# Patient Record
Sex: MALE | Race: WHITE | Employment: FULL TIME | URBAN - METROPOLITAN AREA
[De-identification: names, ages, dates, MRNs, and addresses within clinical notes are randomized per-mention and may not be internally consistent; named-entity substitution may affect disease eponyms.]

---

## 2017-02-13 RX ORDER — LISINOPRIL AND HYDROCHLOROTHIAZIDE 12.5; 1 MG/1; MG/1
1 TABLET ORAL DAILY
Qty: 90 TABLET | Refills: 3 | Status: SHIPPED | OUTPATIENT
Start: 2017-02-13 | End: 2018-01-11 | Stop reason: SDUPTHER

## 2017-02-13 RX ORDER — SIMVASTATIN 40 MG
40 TABLET ORAL NIGHTLY
Qty: 90 TABLET | Refills: 3 | Status: SHIPPED | OUTPATIENT
Start: 2017-02-13 | End: 2018-01-11 | Stop reason: SDUPTHER

## 2017-03-30 ENCOUNTER — OFFICE VISIT (OUTPATIENT)
Dept: PRIMARY CARE CLINIC | Age: 67
End: 2017-03-30

## 2017-03-30 VITALS
DIASTOLIC BLOOD PRESSURE: 70 MMHG | TEMPERATURE: 98.1 F | WEIGHT: 174 LBS | HEART RATE: 64 BPM | BODY MASS INDEX: 25.77 KG/M2 | HEIGHT: 69 IN | RESPIRATION RATE: 12 BRPM | SYSTOLIC BLOOD PRESSURE: 114 MMHG

## 2017-03-30 DIAGNOSIS — Z12.5 SCREENING FOR PROSTATE CANCER: ICD-10-CM

## 2017-03-30 DIAGNOSIS — Z00.00 HEALTH MAINTENANCE EXAMINATION: ICD-10-CM

## 2017-03-30 DIAGNOSIS — I10 ESSENTIAL HYPERTENSION: ICD-10-CM

## 2017-03-30 DIAGNOSIS — Z00.00 HEALTH MAINTENANCE EXAMINATION: Primary | ICD-10-CM

## 2017-03-30 LAB
ALBUMIN SERPL-MCNC: 4.8 G/DL (ref 3.9–4.9)
ALP BLD-CCNC: 68 U/L (ref 35–104)
ALT SERPL-CCNC: 19 U/L (ref 0–41)
ANION GAP SERPL CALCULATED.3IONS-SCNC: 12 MEQ/L (ref 7–13)
AST SERPL-CCNC: 22 U/L (ref 0–40)
BASOPHILS ABSOLUTE: 0 K/UL (ref 0–0.2)
BASOPHILS RELATIVE PERCENT: 0.5 %
BILIRUB SERPL-MCNC: 1.9 MG/DL (ref 0–1.2)
BUN BLDV-MCNC: 27 MG/DL (ref 8–23)
CALCIUM SERPL-MCNC: 10 MG/DL (ref 8.6–10.2)
CHLORIDE BLD-SCNC: 98 MEQ/L (ref 98–107)
CHOLESTEROL, TOTAL: 214 MG/DL (ref 0–199)
CO2: 24 MEQ/L (ref 22–29)
CREAT SERPL-MCNC: 1.08 MG/DL (ref 0.7–1.2)
EOSINOPHILS ABSOLUTE: 0 K/UL (ref 0–0.7)
EOSINOPHILS RELATIVE PERCENT: 0.6 %
GFR AFRICAN AMERICAN: >60
GFR NON-AFRICAN AMERICAN: >60
GLOBULIN: 2.7 G/DL (ref 2.3–3.5)
GLUCOSE BLD-MCNC: 94 MG/DL (ref 74–109)
HCT VFR BLD CALC: 43.9 % (ref 42–52)
HDLC SERPL-MCNC: 83 MG/DL (ref 40–59)
HEMOGLOBIN: 14.9 G/DL (ref 14–18)
LDL CHOLESTEROL CALCULATED: 115 MG/DL (ref 0–129)
LYMPHOCYTES ABSOLUTE: 1 K/UL (ref 1–4.8)
LYMPHOCYTES RELATIVE PERCENT: 18.4 %
MCH RBC QN AUTO: 31.8 PG (ref 27–31.3)
MCHC RBC AUTO-ENTMCNC: 33.9 % (ref 33–37)
MCV RBC AUTO: 93.9 FL (ref 80–100)
MONOCYTES ABSOLUTE: 0.7 K/UL (ref 0.2–0.8)
MONOCYTES RELATIVE PERCENT: 13.5 %
NEUTROPHILS ABSOLUTE: 3.6 K/UL (ref 1.4–6.5)
NEUTROPHILS RELATIVE PERCENT: 67 %
PDW BLD-RTO: 14.1 % (ref 11.5–14.5)
PLATELET # BLD: 213 K/UL (ref 130–400)
POTASSIUM SERPL-SCNC: 4.9 MEQ/L (ref 3.5–5.1)
PROSTATE SPECIFIC ANTIGEN: 1.14 NG/ML (ref 0–5.4)
RBC # BLD: 4.67 M/UL (ref 4.7–6.1)
SODIUM BLD-SCNC: 134 MEQ/L (ref 132–144)
TOTAL PROTEIN: 7.5 G/DL (ref 6.4–8.1)
TRIGL SERPL-MCNC: 80 MG/DL (ref 0–200)
WBC # BLD: 5.4 K/UL (ref 4.8–10.8)

## 2017-03-30 PROCEDURE — 99397 PER PM REEVAL EST PAT 65+ YR: CPT | Performed by: FAMILY MEDICINE

## 2017-03-30 ASSESSMENT — ENCOUNTER SYMPTOMS: ABDOMINAL PAIN: 0

## 2017-06-05 ENCOUNTER — OFFICE VISIT (OUTPATIENT)
Dept: PODIATRY | Age: 67
End: 2017-06-05

## 2017-06-05 VITALS
DIASTOLIC BLOOD PRESSURE: 78 MMHG | HEIGHT: 70 IN | SYSTOLIC BLOOD PRESSURE: 124 MMHG | BODY MASS INDEX: 24.91 KG/M2 | WEIGHT: 174 LBS | RESPIRATION RATE: 16 BRPM | OXYGEN SATURATION: 98 % | HEART RATE: 72 BPM

## 2017-06-05 DIAGNOSIS — M79.671 RIGHT FOOT PAIN: Primary | ICD-10-CM

## 2017-06-05 DIAGNOSIS — L84 CALLOSITY: ICD-10-CM

## 2017-06-05 PROCEDURE — 99203 OFFICE O/P NEW LOW 30 MIN: CPT | Performed by: PODIATRIST

## 2017-06-05 PROCEDURE — 11055 PARING/CUTG B9 HYPRKER LES 1: CPT | Performed by: PODIATRIST

## 2017-10-27 ENCOUNTER — NURSE ONLY (OUTPATIENT)
Dept: PRIMARY CARE CLINIC | Age: 67
End: 2017-10-27

## 2017-10-27 DIAGNOSIS — Z23 NEED FOR INFLUENZA VACCINATION: Primary | ICD-10-CM

## 2017-10-27 PROCEDURE — 90471 IMMUNIZATION ADMIN: CPT | Performed by: FAMILY MEDICINE

## 2017-10-27 PROCEDURE — 90662 IIV NO PRSV INCREASED AG IM: CPT | Performed by: FAMILY MEDICINE

## 2017-10-27 NOTE — PROGRESS NOTES
Vaccine Information Sheet, \"Influenza - Inactivated\"  given to Da Luong, or parent/legal guardian of  Da Luong and verbalized understanding. Patient responses:    Have you ever had a reaction to a flu vaccine? No  Are you able to eat eggs without adverse effects? Yes  Do you have any current illness? No  Have you ever had [place] Syndrome? No    Flu vaccine given per order. Please see immunization tab.

## 2018-01-11 RX ORDER — SIMVASTATIN 40 MG
40 TABLET ORAL NIGHTLY
Qty: 90 TABLET | Refills: 3 | Status: SHIPPED | OUTPATIENT
Start: 2018-01-11 | End: 2019-01-29 | Stop reason: SDUPTHER

## 2018-01-11 RX ORDER — LISINOPRIL AND HYDROCHLOROTHIAZIDE 12.5; 1 MG/1; MG/1
1 TABLET ORAL DAILY
Qty: 90 TABLET | Refills: 3 | Status: SHIPPED | OUTPATIENT
Start: 2018-01-11 | End: 2019-01-29 | Stop reason: SDUPTHER

## 2018-05-10 ENCOUNTER — OFFICE VISIT (OUTPATIENT)
Dept: PRIMARY CARE CLINIC | Age: 68
End: 2018-05-10
Payer: COMMERCIAL

## 2018-05-10 VITALS
DIASTOLIC BLOOD PRESSURE: 76 MMHG | BODY MASS INDEX: 26.81 KG/M2 | SYSTOLIC BLOOD PRESSURE: 134 MMHG | HEIGHT: 69 IN | WEIGHT: 181 LBS | TEMPERATURE: 98.1 F | HEART RATE: 75 BPM | OXYGEN SATURATION: 96 % | RESPIRATION RATE: 16 BRPM

## 2018-05-10 DIAGNOSIS — Z00.00 ANNUAL PHYSICAL EXAM: Primary | ICD-10-CM

## 2018-05-10 DIAGNOSIS — Z23 NEED FOR PNEUMOCOCCAL VACCINATION: ICD-10-CM

## 2018-05-10 DIAGNOSIS — Z12.5 SCREENING FOR PROSTATE CANCER: ICD-10-CM

## 2018-05-10 DIAGNOSIS — Z00.00 ANNUAL PHYSICAL EXAM: ICD-10-CM

## 2018-05-10 DIAGNOSIS — I10 ESSENTIAL HYPERTENSION: ICD-10-CM

## 2018-05-10 DIAGNOSIS — E78.5 HYPERLIPIDEMIA, UNSPECIFIED HYPERLIPIDEMIA TYPE: ICD-10-CM

## 2018-05-10 LAB
ALBUMIN SERPL-MCNC: 4.8 G/DL (ref 3.9–4.9)
ALP BLD-CCNC: 69 U/L (ref 35–104)
ALT SERPL-CCNC: 18 U/L (ref 0–41)
ANION GAP SERPL CALCULATED.3IONS-SCNC: 15 MEQ/L (ref 7–13)
AST SERPL-CCNC: 24 U/L (ref 0–40)
BILIRUB SERPL-MCNC: 1.2 MG/DL (ref 0–1.2)
BUN BLDV-MCNC: 15 MG/DL (ref 8–23)
CALCIUM SERPL-MCNC: 9.5 MG/DL (ref 8.6–10.2)
CHLORIDE BLD-SCNC: 100 MEQ/L (ref 98–107)
CHOLESTEROL, TOTAL: 169 MG/DL (ref 0–199)
CO2: 25 MEQ/L (ref 22–29)
CREAT SERPL-MCNC: 0.77 MG/DL (ref 0.7–1.2)
GFR AFRICAN AMERICAN: >60
GFR NON-AFRICAN AMERICAN: >60
GLOBULIN: 2.4 G/DL (ref 2.3–3.5)
GLUCOSE BLD-MCNC: 82 MG/DL (ref 74–109)
HCT VFR BLD CALC: 40.8 % (ref 42–52)
HDLC SERPL-MCNC: 75 MG/DL (ref 40–59)
HEMOGLOBIN: 13.9 G/DL (ref 14–18)
LDL CHOLESTEROL CALCULATED: 78 MG/DL (ref 0–129)
MCH RBC QN AUTO: 32.5 PG (ref 27–31.3)
MCHC RBC AUTO-ENTMCNC: 34.1 % (ref 33–37)
MCV RBC AUTO: 95.3 FL (ref 80–100)
PDW BLD-RTO: 14.9 % (ref 11.5–14.5)
PLATELET # BLD: 213 K/UL (ref 130–400)
POTASSIUM SERPL-SCNC: 5 MEQ/L (ref 3.5–5.1)
PROSTATE SPECIFIC ANTIGEN: 1.45 NG/ML (ref 0–5.4)
RBC # BLD: 4.28 M/UL (ref 4.7–6.1)
SODIUM BLD-SCNC: 140 MEQ/L (ref 132–144)
TOTAL PROTEIN: 7.2 G/DL (ref 6.4–8.1)
TRIGL SERPL-MCNC: 82 MG/DL (ref 0–200)
WBC # BLD: 5.2 K/UL (ref 4.8–10.8)

## 2018-05-10 PROCEDURE — 90670 PCV13 VACCINE IM: CPT | Performed by: FAMILY MEDICINE

## 2018-05-10 PROCEDURE — 99397 PER PM REEVAL EST PAT 65+ YR: CPT | Performed by: FAMILY MEDICINE

## 2018-05-10 PROCEDURE — 90471 IMMUNIZATION ADMIN: CPT | Performed by: FAMILY MEDICINE

## 2018-05-10 ASSESSMENT — PATIENT HEALTH QUESTIONNAIRE - PHQ9
1. LITTLE INTEREST OR PLEASURE IN DOING THINGS: 0
SUM OF ALL RESPONSES TO PHQ QUESTIONS 1-9: 0
SUM OF ALL RESPONSES TO PHQ9 QUESTIONS 1 & 2: 0
2. FEELING DOWN, DEPRESSED OR HOPELESS: 0

## 2018-05-10 ASSESSMENT — ENCOUNTER SYMPTOMS
EYE DISCHARGE: 0
CHOKING: 0
CHEST TIGHTNESS: 0
PHOTOPHOBIA: 0
NAUSEA: 0
WHEEZING: 0
STRIDOR: 0
FACIAL SWELLING: 0
APNEA: 0
CONSTIPATION: 0
COLOR CHANGE: 0
DIARRHEA: 0
EYE REDNESS: 0
EYE PAIN: 0
ABDOMINAL PAIN: 0

## 2018-09-18 DIAGNOSIS — Z23 NEED FOR SHINGLES VACCINE: Primary | ICD-10-CM

## 2018-10-12 ENCOUNTER — NURSE ONLY (OUTPATIENT)
Dept: PRIMARY CARE CLINIC | Age: 68
End: 2018-10-12
Payer: COMMERCIAL

## 2018-10-12 DIAGNOSIS — Z23 NEED FOR IMMUNIZATION AGAINST INFLUENZA: Primary | ICD-10-CM

## 2018-10-12 PROCEDURE — 90471 IMMUNIZATION ADMIN: CPT | Performed by: FAMILY MEDICINE

## 2018-10-12 PROCEDURE — 90662 IIV NO PRSV INCREASED AG IM: CPT | Performed by: FAMILY MEDICINE

## 2019-01-29 RX ORDER — LISINOPRIL AND HYDROCHLOROTHIAZIDE 12.5; 1 MG/1; MG/1
1 TABLET ORAL DAILY
Qty: 90 TABLET | Refills: 3 | Status: SHIPPED | OUTPATIENT
Start: 2019-01-29

## 2019-01-29 RX ORDER — SIMVASTATIN 40 MG
40 TABLET ORAL NIGHTLY
Qty: 90 TABLET | Refills: 3 | Status: SHIPPED | OUTPATIENT
Start: 2019-01-29

## 2019-03-28 DIAGNOSIS — M54.41 CHRONIC LOW BACK PAIN WITH RIGHT-SIDED SCIATICA, UNSPECIFIED BACK PAIN LATERALITY: Primary | ICD-10-CM

## 2019-03-28 DIAGNOSIS — G89.29 CHRONIC LOW BACK PAIN WITH RIGHT-SIDED SCIATICA, UNSPECIFIED BACK PAIN LATERALITY: Primary | ICD-10-CM

## 2019-03-28 DIAGNOSIS — M54.31 RIGHT SIDED SCIATICA: ICD-10-CM

## 2019-03-28 RX ORDER — PREDNISONE 10 MG/1
TABLET ORAL
Qty: 40 TABLET | Refills: 1 | Status: SHIPPED | OUTPATIENT
Start: 2019-03-28

## 2023-06-01 ENCOUNTER — OFFICE VISIT (OUTPATIENT)
Dept: PRIMARY CARE | Facility: CLINIC | Age: 73
End: 2023-06-01
Payer: COMMERCIAL

## 2023-06-01 VITALS
RESPIRATION RATE: 14 BRPM | HEART RATE: 109 BPM | DIASTOLIC BLOOD PRESSURE: 70 MMHG | BODY MASS INDEX: 25.4 KG/M2 | HEIGHT: 69 IN | OXYGEN SATURATION: 96 % | SYSTOLIC BLOOD PRESSURE: 142 MMHG

## 2023-06-01 DIAGNOSIS — R07.89 CHEST PRESSURE: ICD-10-CM

## 2023-06-01 DIAGNOSIS — Z91.89 AT RISK FOR CORONARY ARTERY DISEASE: ICD-10-CM

## 2023-06-01 DIAGNOSIS — R60.9 EDEMA, UNSPECIFIED TYPE: ICD-10-CM

## 2023-06-01 DIAGNOSIS — T69.9XXA COLD EXPOSURE, INITIAL ENCOUNTER: Primary | ICD-10-CM

## 2023-06-01 DIAGNOSIS — I10 ESSENTIAL HYPERTENSION: ICD-10-CM

## 2023-06-01 PROCEDURE — 1036F TOBACCO NON-USER: CPT | Performed by: FAMILY MEDICINE

## 2023-06-01 PROCEDURE — 3078F DIAST BP <80 MM HG: CPT | Performed by: FAMILY MEDICINE

## 2023-06-01 PROCEDURE — 99213 OFFICE O/P EST LOW 20 MIN: CPT | Performed by: FAMILY MEDICINE

## 2023-06-01 PROCEDURE — 3077F SYST BP >= 140 MM HG: CPT | Performed by: FAMILY MEDICINE

## 2023-06-01 RX ORDER — TORSEMIDE 20 MG/1
20 TABLET ORAL DAILY
Qty: 30 TABLET | Refills: 11 | Status: SHIPPED | OUTPATIENT
Start: 2023-06-01 | End: 2023-06-26

## 2023-06-01 ASSESSMENT — ENCOUNTER SYMPTOMS
NEUROLOGICAL NEGATIVE: 1
RESPIRATORY NEGATIVE: 1
GASTROINTESTINAL NEGATIVE: 1
ENDOCRINE NEGATIVE: 1
PSYCHIATRIC NEGATIVE: 1
HEMATOLOGIC/LYMPHATIC NEGATIVE: 1
CARDIOVASCULAR NEGATIVE: 1
LOSS OF SENSATION IN FEET: 0
MUSCULOSKELETAL NEGATIVE: 1
DEPRESSION: 0
CONSTITUTIONAL NEGATIVE: 1
ALLERGIC/IMMUNOLOGIC NEGATIVE: 1
OCCASIONAL FEELINGS OF UNSTEADINESS: 0
EYES NEGATIVE: 1

## 2023-06-01 NOTE — PROGRESS NOTES
"Subjective   Patient ID: Tonio Sebastian is a 72 y.o. male who presents for chest discomfort and bilateral foot swelling and tingling.  This started after he was in his pool trying to drain part of the water.  The water temperature was very cold and he was doing some work on the pool lines.  He denies nausea vomiting diaphoresis shortness of breath etc.    HPI Pt states his B'L feet have been tingling since this morning, he does have some swelling.    Review of Systems   Constitutional: Negative.    HENT: Negative.     Eyes: Negative.    Respiratory: Negative.     Cardiovascular: Negative.    Gastrointestinal: Negative.    Endocrine: Negative.    Genitourinary: Negative.    Musculoskeletal: Negative.    Skin: Negative.    Allergic/Immunologic: Negative.    Neurological: Negative.    Hematological: Negative.    Psychiatric/Behavioral: Negative.         Objective   /70 (BP Location: Left arm, Patient Position: Sitting, BP Cuff Size: Adult)   Pulse 109   Resp 14   Ht 1.753 m (5' 9\")   SpO2 96%   BMI 25.40 kg/m²     Physical Exam CONSTITUTIONAL- NAD, Pt is A & O x3, Vital signs reviewed per chart.  General Appearance- normal , good hygiene,talks easily  EYES-PERRLA conjunctiva and lids- normal  EARS/NOSE-TM's normal, head normocephalic and atraumatic, naso pharynx-no nasal discharge, no trismus,  oropharynx- normal without exudate  NECK- supple, FROM, without mass  thyroid- NT, normal size, no nodule noted  LYMPH- WNL  CV- RRR without murmur, gallop, or other abnormality.  PULM- CTA bilaterally  Respiratory effort- normal respiratory effort , no retractions or nasal flaring  ABDOMEN- normoactive BS's , soft , NT, no hepatosplenomegaly palpated, or masses. No pulsatile masses noted  extremities no edema,NT  SKIN- no abnormal skin lesions on inspection or palpation  neuro- no focal deficits  PSYCH- pleasant, normal judgement and insight   Strong distal pulses noted in the lower extremities bilaterally.  Normal " capillary refill bilaterally.  Minimal trace edema.  Otherwise completely normal neurovascular exam.  Assessment/Plan   Problem List Items Addressed This Visit       Essential hypertension    Cold exposure - Primary     Other Visit Diagnoses       Chest pressure        Relevant Medications    torsemide (Demadex) 20 mg tablet    Other Relevant Orders    XR chest 2 views (Completed)    At risk for coronary artery disease        Relevant Orders    CT cardiac scoring wo IV contrast    Edema, unspecified type                 Scribe Attestation  By signing my name below, I, Miguel Stover DO  , Scribe   attest that this documentation has been prepared under the direction and in the presence of Miguel Stover DO.

## 2023-06-06 PROBLEM — E78.5 HYPERLIPIDEMIA: Status: ACTIVE | Noted: 2023-06-06

## 2023-06-06 PROBLEM — T69.9XXA COLD EXPOSURE: Status: ACTIVE | Noted: 2023-06-06

## 2023-06-06 PROBLEM — R60.0 LOCALIZED EDEMA: Status: ACTIVE | Noted: 2023-06-06

## 2023-06-06 PROBLEM — M72.0 DUPUYTREN CONTRACTURE: Status: ACTIVE | Noted: 2023-06-06

## 2023-06-06 PROBLEM — I10 ESSENTIAL HYPERTENSION: Status: ACTIVE | Noted: 2023-06-06

## 2023-06-23 DIAGNOSIS — R07.89 CHEST PRESSURE: ICD-10-CM

## 2023-06-26 RX ORDER — TORSEMIDE 20 MG/1
TABLET ORAL
Qty: 30 TABLET | Refills: 2 | Status: SHIPPED | OUTPATIENT
Start: 2023-06-26 | End: 2024-06-10 | Stop reason: WASHOUT

## 2023-09-26 ENCOUNTER — OFFICE VISIT (OUTPATIENT)
Dept: PRIMARY CARE | Facility: CLINIC | Age: 73
End: 2023-09-26
Payer: COMMERCIAL

## 2023-09-26 VITALS
WEIGHT: 170 LBS | RESPIRATION RATE: 16 BRPM | OXYGEN SATURATION: 98 % | SYSTOLIC BLOOD PRESSURE: 120 MMHG | DIASTOLIC BLOOD PRESSURE: 56 MMHG | HEART RATE: 76 BPM | HEIGHT: 69 IN | BODY MASS INDEX: 25.18 KG/M2

## 2023-09-26 DIAGNOSIS — R53.83 FATIGUE, UNSPECIFIED TYPE: ICD-10-CM

## 2023-09-26 DIAGNOSIS — Z12.5 SCREENING FOR PROSTATE CANCER: ICD-10-CM

## 2023-09-26 DIAGNOSIS — Z00.00 ANNUAL PHYSICAL EXAM: Primary | ICD-10-CM

## 2023-09-26 PROBLEM — R93.1 AGATSTON CAC SCORE 100-199: Status: ACTIVE | Noted: 2023-09-26

## 2023-09-26 PROCEDURE — 99397 PER PM REEVAL EST PAT 65+ YR: CPT | Performed by: FAMILY MEDICINE

## 2023-09-26 RX ORDER — LISINOPRIL AND HYDROCHLOROTHIAZIDE 10; 12.5 MG/1; MG/1
1 TABLET ORAL DAILY
COMMUNITY
Start: 2015-08-18

## 2023-09-26 RX ORDER — CHOLECALCIFEROL (VITAMIN D3) 25 MCG
1 TABLET ORAL DAILY
COMMUNITY

## 2023-09-26 RX ORDER — SIMVASTATIN 40 MG/1
1 TABLET, FILM COATED ORAL DAILY
COMMUNITY
Start: 2015-08-18

## 2023-09-26 NOTE — PATIENT INSTRUCTIONS
Follow up PRN    Routine labs ordered    Liquid nitrogen applied to lesion on left side of forehead

## 2023-09-26 NOTE — PROGRESS NOTES
Subjective   Patient ID: Tonio Sebastian is a 72 y.o. male who presents for annual exam     HPI Annual physical   Eats a generally healthy diet   Exercises   Denies any chest pain,SOB  No Abdominal pain   No black or bloody stools   Urination/BM normal     Pt has a lesion on his forehead that he noticed a few weeks ago.   Liquid nitrogen applied    Review of Systems 12 Systems have been reviewed as follows.  Constitutional: Fever, weight gain, weight loss, appetite change, night sweats, fatigue, chills.  Eyes : blurry, double vision, vision, loss, tearing, redness, pain, sensitivity to light, glaucoma.  Ears: nose, mouth, and throat: Hearing loss, ringing in the ears, ear pain, nasal congestion, nasal drainage, nosebleeds, mouth, throat, irritation tooth problem.  Cardiovascular: chest pain, pressure, heart racing, palpitations, sweating, leg swelling, high or low blood pressure  Pulmonary: Cough, yellow or green sputum, blood and sputum, shortness of breath, wheezing  Gastrointestinal: Nausea, vomiting, diarrhea, constipation, pain, blood in stool, or vomitus, heartburn, difficulty swallowing  Genitourinary: incontinence, abnormal bleeding, abnormal discharge, urinary frequency, urinary hesitancy, pain, impotence sexual problem, infection, urinary retention  Musculoskeletal: Pain, stiffness, joint, redness or warmth, arthritis, back pain, weakness, muscle wasting, sprain or fracture  Neuro: Weight weakness, dizziness, change in voice, change in taste change in vision, change in hearing, loss, or change of sensation, trouble walking, balance problems coordination problems, shaking, speech problem  Endocrine: cold or heat intolerance, blood sugar problem, weight gain or loss missed periods hot flashes, sweats, change in body hair, change in libido, increased thirst, increased urination  Heme/lymph: Swelling, bleeding, problem anemia, bruising, enlarged lymph nodes  Allergic/immunologic: H. plus nasal drip, watery  "itchy eyes, nasal drainage, immunosuppressed  The above were reviewed and noted negative except as noted in HPI and Problem List.      Objective   /56   Pulse 76   Resp 16   Ht 1.753 m (5' 9\")   Wt 77.1 kg (170 lb)   SpO2 98%   BMI 25.10 kg/m²     Physical Exam CONSTITUTIONAL- NAD, Pt is A & O x3, Vital signs reviewed per chart.  General Appearance- normal , good hygiene,talks easily  EYES-PERRLA conjunctiva and lids- normal  EARS/NOSE-TM's normal, head normocephalic and atraumatic, naso pharynx-no nasal discharge, no trismus,  oropharynx- normal without exudate  NECK- supple, FROM, without mass  thyroid- NT, normal size, no nodule noted  LYMPH- WNL  CV- RRR without murmur, gallop, or other abnormality.  PULM- CTA bilaterally  Respiratory effort- normal respiratory effort , no retractions or nasal flaring  ABDOMEN- normoactive BS's , soft , NT, no hepatosplenomegaly palpated, or masses. No pulsatile masses noted  extremities no edema,NT  SKIN- no abnormal skin lesions on inspection or palpation  neuro- no focal deficits  PSYCH- pleasant, normal judgement and insight  LN2 to 4x6mm ju K R forehead.    Assessment/Plan   Problem List Items Addressed This Visit    None  Visit Diagnoses         Codes    Annual physical exam    -  Primary Z00.00    Relevant Orders    CBC and Auto Differential    Lipid Panel    Comprehensive Metabolic Panel    Thyroxine, Free    Thyroxine, Total    Thyroid Stimulating Hormone    Fatigue, unspecified type     R53.83    Relevant Orders    CBC and Auto Differential    Lipid Panel    Comprehensive Metabolic Panel    Thyroxine, Free    Thyroxine, Total    Thyroid Stimulating Hormone    Screening for prostate cancer     Z12.5    Relevant Orders    Prostate Specific Antigen, Screen             Scribe Attestation  By signing my name below, I, Miguel Stover DO, Scribe   attest that this documentation has been prepared under the direction and in the presence of Miguel Stover DO.    "

## 2023-09-29 ENCOUNTER — LAB (OUTPATIENT)
Dept: LAB | Facility: LAB | Age: 73
End: 2023-09-29
Payer: COMMERCIAL

## 2023-09-29 ENCOUNTER — CLINICAL SUPPORT (OUTPATIENT)
Dept: PRIMARY CARE | Facility: CLINIC | Age: 73
End: 2023-09-29
Payer: COMMERCIAL

## 2023-09-29 DIAGNOSIS — Z12.5 SCREENING FOR PROSTATE CANCER: ICD-10-CM

## 2023-09-29 DIAGNOSIS — Z00.00 ANNUAL PHYSICAL EXAM: ICD-10-CM

## 2023-09-29 DIAGNOSIS — R53.83 FATIGUE, UNSPECIFIED TYPE: ICD-10-CM

## 2023-09-29 DIAGNOSIS — Z23 ENCOUNTER FOR IMMUNIZATION: ICD-10-CM

## 2023-09-29 DIAGNOSIS — Z23 NEED FOR INFLUENZA VACCINATION: ICD-10-CM

## 2023-09-29 LAB
ALANINE AMINOTRANSFERASE (SGPT) (U/L) IN SER/PLAS: 16 U/L (ref 10–52)
ALBUMIN (G/DL) IN SER/PLAS: 4.4 G/DL (ref 3.4–5)
ALKALINE PHOSPHATASE (U/L) IN SER/PLAS: 63 U/L (ref 33–136)
ANION GAP IN SER/PLAS: 12 MMOL/L (ref 10–20)
ASPARTATE AMINOTRANSFERASE (SGOT) (U/L) IN SER/PLAS: 19 U/L (ref 9–39)
BASOPHILS (10*3/UL) IN BLOOD BY AUTOMATED COUNT: 0.02 X10E9/L (ref 0–0.1)
BASOPHILS/100 LEUKOCYTES IN BLOOD BY AUTOMATED COUNT: 0.4 % (ref 0–2)
BILIRUBIN TOTAL (MG/DL) IN SER/PLAS: 0.9 MG/DL (ref 0–1.2)
CALCIUM (MG/DL) IN SER/PLAS: 9.5 MG/DL (ref 8.6–10.3)
CARBON DIOXIDE, TOTAL (MMOL/L) IN SER/PLAS: 27 MMOL/L (ref 21–32)
CHLORIDE (MMOL/L) IN SER/PLAS: 105 MMOL/L (ref 98–107)
CHOLESTEROL (MG/DL) IN SER/PLAS: 170 MG/DL (ref 0–199)
CHOLESTEROL IN HDL (MG/DL) IN SER/PLAS: 77.3 MG/DL
CHOLESTEROL/HDL RATIO: 2.2
CREATININE (MG/DL) IN SER/PLAS: 1.05 MG/DL (ref 0.5–1.3)
EOSINOPHILS (10*3/UL) IN BLOOD BY AUTOMATED COUNT: 0.06 X10E9/L (ref 0–0.4)
EOSINOPHILS/100 LEUKOCYTES IN BLOOD BY AUTOMATED COUNT: 1.3 % (ref 0–6)
ERYTHROCYTE DISTRIBUTION WIDTH (RATIO) BY AUTOMATED COUNT: 12.9 % (ref 11.5–14.5)
ERYTHROCYTE MEAN CORPUSCULAR HEMOGLOBIN CONCENTRATION (G/DL) BY AUTOMATED: 32.6 G/DL (ref 32–36)
ERYTHROCYTE MEAN CORPUSCULAR VOLUME (FL) BY AUTOMATED COUNT: 101 FL (ref 80–100)
ERYTHROCYTES (10*6/UL) IN BLOOD BY AUTOMATED COUNT: 3.8 X10E12/L (ref 4.5–5.9)
GFR MALE: 75 ML/MIN/1.73M2
GLUCOSE (MG/DL) IN SER/PLAS: 81 MG/DL (ref 74–99)
HEMATOCRIT (%) IN BLOOD BY AUTOMATED COUNT: 38.3 % (ref 41–52)
HEMOGLOBIN (G/DL) IN BLOOD: 12.5 G/DL (ref 13.5–17.5)
IMMATURE GRANULOCYTES/100 LEUKOCYTES IN BLOOD BY AUTOMATED COUNT: 0.2 % (ref 0–0.9)
LDL: 79 MG/DL (ref 0–99)
LEUKOCYTES (10*3/UL) IN BLOOD BY AUTOMATED COUNT: 4.6 X10E9/L (ref 4.4–11.3)
LYMPHOCYTES (10*3/UL) IN BLOOD BY AUTOMATED COUNT: 0.7 X10E9/L (ref 0.8–3)
LYMPHOCYTES/100 LEUKOCYTES IN BLOOD BY AUTOMATED COUNT: 15.1 % (ref 13–44)
MONOCYTES (10*3/UL) IN BLOOD BY AUTOMATED COUNT: 0.37 X10E9/L (ref 0.05–0.8)
MONOCYTES/100 LEUKOCYTES IN BLOOD BY AUTOMATED COUNT: 8 % (ref 2–10)
NEUTROPHILS (10*3/UL) IN BLOOD BY AUTOMATED COUNT: 3.48 X10E9/L (ref 1.6–5.5)
NEUTROPHILS/100 LEUKOCYTES IN BLOOD BY AUTOMATED COUNT: 75 % (ref 40–80)
PLATELETS (10*3/UL) IN BLOOD AUTOMATED COUNT: 227 X10E9/L (ref 150–450)
POTASSIUM (MMOL/L) IN SER/PLAS: 4.7 MMOL/L (ref 3.5–5.3)
PROSTATE SPECIFIC ANTIGEN,SCREEN: 1.29 NG/ML (ref 0–4)
PROTEIN TOTAL: 7.3 G/DL (ref 6.4–8.2)
SODIUM (MMOL/L) IN SER/PLAS: 139 MMOL/L (ref 136–145)
THYROTROPIN (MIU/L) IN SER/PLAS BY DETECTION LIMIT <= 0.05 MIU/L: 2.04 MIU/L (ref 0.44–3.98)
THYROXINE (T4) (UG/DL) IN SER/PLAS: 4.5 UG/DL (ref 4.5–11.1)
THYROXINE (T4) FREE (NG/DL) IN SER/PLAS: 0.79 NG/DL (ref 0.61–1.12)
TRIGLYCERIDE (MG/DL) IN SER/PLAS: 68 MG/DL (ref 0–149)
UREA NITROGEN (MG/DL) IN SER/PLAS: 28 MG/DL (ref 6–23)
VLDL: 14 MG/DL (ref 0–40)

## 2023-09-29 PROCEDURE — 80061 LIPID PANEL: CPT

## 2023-09-29 PROCEDURE — 36415 COLL VENOUS BLD VENIPUNCTURE: CPT

## 2023-09-29 PROCEDURE — 85025 COMPLETE CBC W/AUTO DIFF WBC: CPT

## 2023-09-29 PROCEDURE — 90662 IIV NO PRSV INCREASED AG IM: CPT | Performed by: FAMILY MEDICINE

## 2023-09-29 PROCEDURE — 84153 ASSAY OF PSA TOTAL: CPT

## 2023-09-29 PROCEDURE — 80053 COMPREHEN METABOLIC PANEL: CPT

## 2023-09-29 PROCEDURE — 90471 IMMUNIZATION ADMIN: CPT | Performed by: FAMILY MEDICINE

## 2023-09-29 PROCEDURE — 84443 ASSAY THYROID STIM HORMONE: CPT

## 2023-09-29 PROCEDURE — 84439 ASSAY OF FREE THYROXINE: CPT

## 2023-09-29 NOTE — PROGRESS NOTES
Patient presents for influenza vaccine.  Patient information on flu given to patient  Temp 97.8, denies any reaction to previous doses, denies allergies to eggs and latex

## 2023-11-15 ENCOUNTER — SPECIALTY PHARMACY (OUTPATIENT)
Dept: PHARMACY | Facility: CLINIC | Age: 73
End: 2023-11-15

## 2023-11-15 DIAGNOSIS — I10 ESSENTIAL HYPERTENSION: ICD-10-CM

## 2023-11-15 DIAGNOSIS — E78.5 HYPERLIPIDEMIA, UNSPECIFIED HYPERLIPIDEMIA TYPE: ICD-10-CM

## 2023-11-15 RX ORDER — LISINOPRIL AND HYDROCHLOROTHIAZIDE 10; 12.5 MG/1; MG/1
1 TABLET ORAL DAILY
Qty: 90 TABLET | Refills: 3 | Status: SHIPPED | OUTPATIENT
Start: 2023-11-15 | End: 2024-11-13

## 2023-11-15 RX ORDER — SIMVASTATIN 40 MG/1
40 TABLET, FILM COATED ORAL DAILY
Qty: 90 TABLET | Refills: 3 | Status: SHIPPED | OUTPATIENT
Start: 2023-11-15 | End: 2024-11-13

## 2023-11-16 ENCOUNTER — PHARMACY VISIT (OUTPATIENT)
Dept: PHARMACY | Facility: CLINIC | Age: 73
End: 2023-11-16
Payer: COMMERCIAL

## 2023-11-16 PROCEDURE — RXMED WILLOW AMBULATORY MEDICATION CHARGE

## 2023-11-20 ENCOUNTER — SPECIALTY PHARMACY (OUTPATIENT)
Dept: PHARMACY | Facility: CLINIC | Age: 73
End: 2023-11-20

## 2023-12-05 ENCOUNTER — OFFICE VISIT (OUTPATIENT)
Dept: OPHTHALMOLOGY | Facility: CLINIC | Age: 73
End: 2023-12-05
Payer: COMMERCIAL

## 2023-12-05 DIAGNOSIS — H52.4 PRESBYOPIA: ICD-10-CM

## 2023-12-05 DIAGNOSIS — H25.13 NUCLEAR SCLEROTIC CATARACT OF BOTH EYES: ICD-10-CM

## 2023-12-05 DIAGNOSIS — H52.13 MYOPIA OF BOTH EYES: Primary | ICD-10-CM

## 2023-12-05 PROCEDURE — 92004 COMPRE OPH EXAM NEW PT 1/>: CPT | Performed by: OPTOMETRIST

## 2023-12-05 PROCEDURE — 92015 DETERMINE REFRACTIVE STATE: CPT | Performed by: OPTOMETRIST

## 2023-12-05 RX ORDER — ASPIRIN 81 MG/1
81 TABLET ORAL DAILY
COMMUNITY

## 2023-12-05 ASSESSMENT — TONOMETRY
OD_IOP_MMHG: 16
IOP_METHOD: GOLDMANN APPLANATION
OS_IOP_MMHG: 16

## 2023-12-05 ASSESSMENT — REFRACTION_MANIFEST
OS_AXIS: 090
METHOD_AUTOREFRACTION: 1
OD_SPHERE: -0.75
OD_SPHERE: -0.50
OS_ADD: +2.50
OS_SPHERE: -0.25
OS_SPHERE: -0.25
OS_AXIS: 093
OS_CYLINDER: -0.75
OD_ADD: +2.50
OS_CYLINDER: -0.75
OD_AXIS: 001
OD_CYLINDER: -0.25

## 2023-12-05 ASSESSMENT — CONF VISUAL FIELD
OS_NORMAL: 1
OD_SUPERIOR_NASAL_RESTRICTION: 0
OS_SUPERIOR_NASAL_RESTRICTION: 0
METHOD: COUNTING FINGERS
OD_NORMAL: 1
OS_INFERIOR_TEMPORAL_RESTRICTION: 0
OD_INFERIOR_TEMPORAL_RESTRICTION: 0
OS_INFERIOR_NASAL_RESTRICTION: 0
OD_INFERIOR_NASAL_RESTRICTION: 0
OD_SUPERIOR_TEMPORAL_RESTRICTION: 0
OS_SUPERIOR_TEMPORAL_RESTRICTION: 0

## 2023-12-05 ASSESSMENT — ENCOUNTER SYMPTOMS
GASTROINTESTINAL NEGATIVE: 0
ENDOCRINE NEGATIVE: 0
HEMATOLOGIC/LYMPHATIC NEGATIVE: 0
RESPIRATORY NEGATIVE: 0
ALLERGIC/IMMUNOLOGIC NEGATIVE: 0
MUSCULOSKELETAL NEGATIVE: 0
CARDIOVASCULAR NEGATIVE: 0
PSYCHIATRIC NEGATIVE: 0
NEUROLOGICAL NEGATIVE: 0
CONSTITUTIONAL NEGATIVE: 0
EYES NEGATIVE: 0

## 2023-12-05 ASSESSMENT — CUP TO DISC RATIO
OD_RATIO: 0.2
OS_RATIO: 0.2

## 2023-12-05 ASSESSMENT — SLIT LAMP EXAM - LIDS
COMMENTS: NORMAL
COMMENTS: NORMAL

## 2023-12-05 ASSESSMENT — VISUAL ACUITY
OS_SC: 20/40
OS_SC+: -3
OD_SC: 20/30
METHOD: SNELLEN - LINEAR
OD_SC+: -3

## 2023-12-05 ASSESSMENT — REFRACTION
OD_SPHERE: -0.50
OD_ADD: +2.50
OS_CYLINDER: -0.75
OS_ADD: +2.50
OS_SPHERE: -0.50
OS_AXIS: 090

## 2023-12-05 ASSESSMENT — EXTERNAL EXAM - RIGHT EYE: OD_EXAM: NORMAL

## 2023-12-05 ASSESSMENT — EXTERNAL EXAM - LEFT EYE: OS_EXAM: NORMAL

## 2023-12-05 NOTE — PROGRESS NOTES
Assessment/Plan   Diagnoses and all orders for this visit:  Myopia of both eyes  Presbyopia  New spec rx released today per patient request. Ocular health wnl for age OU. Monitor 1 year or sooner prn. Refraction billed today.    Nuclear sclerotic cataract of both eyes  Patient's cataracts are not visually significant. Will monitor for changes. No indication for surgery at this time.

## 2023-12-12 ENCOUNTER — SPECIALTY PHARMACY (OUTPATIENT)
Dept: PHARMACY | Facility: CLINIC | Age: 73
End: 2023-12-12

## 2024-02-20 ENCOUNTER — PHARMACY VISIT (OUTPATIENT)
Dept: PHARMACY | Facility: CLINIC | Age: 74
End: 2024-02-20
Payer: COMMERCIAL

## 2024-02-20 ENCOUNTER — SPECIALTY PHARMACY (OUTPATIENT)
Dept: PHARMACY | Facility: CLINIC | Age: 74
End: 2024-02-20

## 2024-02-20 PROCEDURE — RXMED WILLOW AMBULATORY MEDICATION CHARGE

## 2024-05-16 ENCOUNTER — TELEPHONE (OUTPATIENT)
Dept: PRIMARY CARE | Facility: CLINIC | Age: 74
End: 2024-05-16
Payer: COMMERCIAL

## 2024-05-16 DIAGNOSIS — D64.9 ANEMIA, UNSPECIFIED TYPE: Primary | ICD-10-CM

## 2024-05-20 ENCOUNTER — SPECIALTY PHARMACY (OUTPATIENT)
Dept: PHARMACY | Facility: CLINIC | Age: 74
End: 2024-05-20

## 2024-05-20 PROCEDURE — RXMED WILLOW AMBULATORY MEDICATION CHARGE

## 2024-05-22 ENCOUNTER — PHARMACY VISIT (OUTPATIENT)
Dept: PHARMACY | Facility: CLINIC | Age: 74
End: 2024-05-22
Payer: COMMERCIAL

## 2024-06-05 ENCOUNTER — LAB (OUTPATIENT)
Dept: LAB | Facility: LAB | Age: 74
End: 2024-06-05
Payer: COMMERCIAL

## 2024-06-05 DIAGNOSIS — D64.9 ANEMIA, UNSPECIFIED TYPE: ICD-10-CM

## 2024-06-05 LAB
ERYTHROCYTE [DISTWIDTH] IN BLOOD BY AUTOMATED COUNT: 13.2 % (ref 11.5–14.5)
HCT VFR BLD AUTO: 38.2 % (ref 41–52)
HGB BLD-MCNC: 12.8 G/DL (ref 13.5–17.5)
MCH RBC QN AUTO: 32.8 PG (ref 26–34)
MCHC RBC AUTO-ENTMCNC: 33.5 G/DL (ref 32–36)
MCV RBC AUTO: 98 FL (ref 80–100)
NRBC BLD-RTO: 0 /100 WBCS (ref 0–0)
PLATELET # BLD AUTO: 211 X10*3/UL (ref 150–450)
RBC # BLD AUTO: 3.9 X10*6/UL (ref 4.5–5.9)
WBC # BLD AUTO: 6.3 X10*3/UL (ref 4.4–11.3)

## 2024-06-05 PROCEDURE — 36415 COLL VENOUS BLD VENIPUNCTURE: CPT

## 2024-06-05 PROCEDURE — 85027 COMPLETE CBC AUTOMATED: CPT

## 2024-06-10 DIAGNOSIS — Z12.11 SCREEN FOR COLON CANCER: ICD-10-CM

## 2024-06-10 DIAGNOSIS — Z00.00 ROUTINE GENERAL MEDICAL EXAMINATION AT A HEALTH CARE FACILITY: Primary | ICD-10-CM

## 2024-07-24 ENCOUNTER — APPOINTMENT (OUTPATIENT)
Dept: DERMATOLOGY | Facility: CLINIC | Age: 74
End: 2024-07-24
Payer: COMMERCIAL

## 2024-07-24 DIAGNOSIS — D18.01 HEMANGIOMA OF SKIN: ICD-10-CM

## 2024-07-24 DIAGNOSIS — D22.9 MELANOCYTIC NEVUS, UNSPECIFIED LOCATION: Primary | ICD-10-CM

## 2024-07-24 DIAGNOSIS — Z12.83 ENCOUNTER FOR SCREENING FOR MALIGNANT NEOPLASM OF SKIN: ICD-10-CM

## 2024-07-24 DIAGNOSIS — L57.0 ACTINIC KERATOSIS: ICD-10-CM

## 2024-07-24 DIAGNOSIS — L81.4 LENTIGO: ICD-10-CM

## 2024-07-24 DIAGNOSIS — L82.1 SEBORRHEIC KERATOSIS: ICD-10-CM

## 2024-07-24 PROCEDURE — 17003 DESTRUCT PREMALG LES 2-14: CPT | Performed by: DERMATOLOGY

## 2024-07-24 PROCEDURE — 1159F MED LIST DOCD IN RCRD: CPT | Performed by: DERMATOLOGY

## 2024-07-24 PROCEDURE — 17000 DESTRUCT PREMALG LESION: CPT | Performed by: DERMATOLOGY

## 2024-07-24 PROCEDURE — 99203 OFFICE O/P NEW LOW 30 MIN: CPT | Performed by: DERMATOLOGY

## 2024-07-24 PROCEDURE — 1036F TOBACCO NON-USER: CPT | Performed by: DERMATOLOGY

## 2024-07-24 ASSESSMENT — DERMATOLOGY QUALITY OF LIFE (QOL) ASSESSMENT
RATE HOW BOTHERED YOU ARE BY EFFECTS OF YOUR SKIN PROBLEMS ON YOUR ACTIVITIES (EG, GOING OUT, ACCOMPLISHING WHAT YOU WANT, WORK ACTIVITIES OR YOUR RELATIONSHIPS WITH OTHERS): 0 - NEVER BOTHERED
ARE THERE EXCLUSIONS OR EXCEPTIONS FOR THE QUALITY OF LIFE ASSESSMENT: NO
DATE THE QUALITY-OF-LIFE ASSESSMENT WAS COMPLETED: 67045
WHAT SINGLE SKIN CONDITION LISTED BELOW IS THE PATIENT ANSWERING THE QUALITY-OF-LIFE ASSESSMENT QUESTIONS ABOUT: NONE OF THE ABOVE
RATE HOW BOTHERED YOU ARE BY SYMPTOMS OF YOUR SKIN PROBLEM (EG, ITCHING, STINGING BURNING, HURTING OR SKIN IRRITATION): 0 - NEVER BOTHERED
RATE HOW EMOTIONALLY BOTHERED YOU ARE BY YOUR SKIN PROBLEM (FOR EXAMPLE, WORRY, EMBARRASSMENT, FRUSTRATION): 0 - NEVER BOTHERED

## 2024-07-24 ASSESSMENT — DERMATOLOGY PATIENT ASSESSMENT
DO YOU USE A TANNING BED: NO
HAVE YOU HAD OR DO YOU HAVE VASCULAR DISEASE: NO
DO YOU HAVE ANY NEW OR CHANGING LESIONS: NO
HAVE YOU HAD OR DO YOU HAVE A STAPH INFECTION: NO
ARE YOU AN ORGAN TRANSPLANT RECIPIENT: NO
DO YOU USE SUNSCREEN: OCCASIONALLY

## 2024-07-24 ASSESSMENT — PATIENT GLOBAL ASSESSMENT (PGA): PATIENT GLOBAL ASSESSMENT: PATIENT GLOBAL ASSESSMENT:  1 - CLEAR

## 2024-07-24 NOTE — PROGRESS NOTES
Subjective     Tonio Sebastian is a 73 y.o. male who presents for the following: Skin Check.     Skin Cancer Screening  He has a history of moderate sun exposure. He is in the sun occasionally. He uses sunscreen occasionally. He reports no skin symptoms. His moles are not changing.    Spots that concern him: None          Review of Systems:  No other skin or systemic complaints other than what is documented elsewhere in the note.    The following portions of the chart were reviewed this encounter and updated as appropriate:       Skin Cancer History  No skin cancer on file.    Specialty Problems    None    Past Medical History:  Tonio Sebastian  has a past medical history of Personal history of other diseases of the circulatory system (09/23/2021).    Past Surgical History:  Tonio Sebastian  has a past surgical history that includes Other surgical history (08/26/2020) and Other surgical history (08/26/2020).    Family History:  Patient family history is not on file.       Objective   Well appearing patient in no apparent distress; mood and affect are within normal limits.    A full examination was performed including scalp, head, eyes, ears, nose, lips, neck, chest, axillae, abdomen, back, buttocks, bilateral upper extremities, bilateral lower extremities, hands, feet, fingers, toes, fingernails, and toenails. All findings within normal limits unless otherwise noted below.    Assessment/Plan   1. Actinic keratosis (2)  Scalp (2)  Destruction of Actinic Keratosis Procedure Note  Diagnosis: AK  Location: see physical exam  Informed consent: Discussed risks (permanent scarring, light or dark discoloration, infection, pain, bleeding, bruising, redness, blister formation, and recurrence of the lesion) and benefits of the procedure, as well as the alternatives.  Informed consent was obtained.  Anesthesia: not required  Procedure Details:  The lesion was destroyed with liquid nitrogen. The patient tolerated procedure  well.  Total number of lesions treated:  2  Plan:  The patient was instructed on post-op care. Recommend OTC analgesia as needed for pain.      Destr of lesion - Scalp (2)  Complexity: simple    Destruction method: cryotherapy    Informed consent: discussed and consent obtained    Lesion destroyed using liquid nitrogen: Yes    Cryotherapy cycles:  1  Outcome: patient tolerated procedure well with no complications    Post-procedure details: wound care instructions given      2. Encounter for screening for malignant neoplasm of skin    3. Hemangioma of skin  Scattered cherry-red papule(s).    4. Melanocytic nevus, unspecified location  All nevi were symmetric brown macules without atypia on dermoscopy.     The ABCDEs of melanoma and warning signs of non-melanoma skin cancer were discussed with patient and patient expressed understanding. Sun protection and use of at least SPF 30 discussed with patient. Pt instructed to reapply every 2 hours.     5. Lentigo  Scattered tan macules in sun-exposed areas.    The ABCDEs of melanoma and warning signs of non-melanoma skin cancer were discussed with patient and patient expressed understanding. Sun protection and use of at least SPF 30 discussed with patient. Pt instructed to reapply every 2 hours.     6. Seborrheic keratosis  Stuck on verrucous, tan-brown papules and plaques.         Follow up in 1 year or sooner as needed.

## 2024-08-20 ENCOUNTER — SPECIALTY PHARMACY (OUTPATIENT)
Dept: PHARMACY | Facility: CLINIC | Age: 74
End: 2024-08-20

## 2024-08-20 PROCEDURE — RXMED WILLOW AMBULATORY MEDICATION CHARGE

## 2024-08-22 ENCOUNTER — PHARMACY VISIT (OUTPATIENT)
Dept: PHARMACY | Facility: CLINIC | Age: 74
End: 2024-08-22
Payer: COMMERCIAL

## 2024-08-30 ENCOUNTER — ANESTHESIA EVENT (OUTPATIENT)
Dept: GASTROENTEROLOGY | Facility: EXTERNAL LOCATION | Age: 74
End: 2024-08-30

## 2024-09-10 ENCOUNTER — APPOINTMENT (OUTPATIENT)
Dept: GASTROENTEROLOGY | Facility: EXTERNAL LOCATION | Age: 74
End: 2024-09-10
Payer: COMMERCIAL

## 2024-09-10 ENCOUNTER — ANESTHESIA (OUTPATIENT)
Dept: GASTROENTEROLOGY | Facility: EXTERNAL LOCATION | Age: 74
End: 2024-09-10

## 2024-09-10 VITALS
RESPIRATION RATE: 15 BRPM | WEIGHT: 170 LBS | DIASTOLIC BLOOD PRESSURE: 57 MMHG | BODY MASS INDEX: 25.18 KG/M2 | TEMPERATURE: 96.8 F | HEART RATE: 73 BPM | SYSTOLIC BLOOD PRESSURE: 118 MMHG | OXYGEN SATURATION: 98 % | HEIGHT: 69 IN

## 2024-09-10 DIAGNOSIS — Z12.11 SCREEN FOR COLON CANCER: ICD-10-CM

## 2024-09-10 DIAGNOSIS — Z00.00 ROUTINE GENERAL MEDICAL EXAMINATION AT A HEALTH CARE FACILITY: ICD-10-CM

## 2024-09-10 PROCEDURE — 45385 COLONOSCOPY W/LESION REMOVAL: CPT | Performed by: STUDENT IN AN ORGANIZED HEALTH CARE EDUCATION/TRAINING PROGRAM

## 2024-09-10 RX ORDER — NORETHINDRONE AND ETHINYL ESTRADIOL 0.5-0.035
KIT ORAL AS NEEDED
Status: DISCONTINUED | OUTPATIENT
Start: 2024-09-10 | End: 2024-09-10

## 2024-09-10 RX ORDER — PROPOFOL 10 MG/ML
INJECTION, EMULSION INTRAVENOUS AS NEEDED
Status: DISCONTINUED | OUTPATIENT
Start: 2024-09-10 | End: 2024-09-10

## 2024-09-10 RX ORDER — LIDOCAINE HYDROCHLORIDE 20 MG/ML
INJECTION, SOLUTION INFILTRATION; PERINEURAL AS NEEDED
Status: DISCONTINUED | OUTPATIENT
Start: 2024-09-10 | End: 2024-09-10

## 2024-09-10 RX ORDER — SODIUM CHLORIDE 9 MG/ML
20 INJECTION, SOLUTION INTRAVENOUS CONTINUOUS
Status: DISCONTINUED | OUTPATIENT
Start: 2024-09-10 | End: 2024-09-11 | Stop reason: HOSPADM

## 2024-09-10 RX ORDER — PHENYLEPHRINE HYDROCHLORIDE 10 MG/ML
INJECTION INTRAVENOUS AS NEEDED
Status: DISCONTINUED | OUTPATIENT
Start: 2024-09-10 | End: 2024-09-10

## 2024-09-10 SDOH — HEALTH STABILITY: MENTAL HEALTH: CURRENT SMOKER: 0

## 2024-09-10 ASSESSMENT — PAIN - FUNCTIONAL ASSESSMENT
PAIN_FUNCTIONAL_ASSESSMENT: 0-10

## 2024-09-10 ASSESSMENT — PAIN SCALES - GENERAL
PAIN_LEVEL: 1
PAINLEVEL_OUTOF10: 0 - NO PAIN

## 2024-09-10 ASSESSMENT — COLUMBIA-SUICIDE SEVERITY RATING SCALE - C-SSRS
1. IN THE PAST MONTH, HAVE YOU WISHED YOU WERE DEAD OR WISHED YOU COULD GO TO SLEEP AND NOT WAKE UP?: NO
6. HAVE YOU EVER DONE ANYTHING, STARTED TO DO ANYTHING, OR PREPARED TO DO ANYTHING TO END YOUR LIFE?: NO

## 2024-09-10 NOTE — DISCHARGE INSTRUCTIONS

## 2024-09-10 NOTE — ADDENDUM NOTE
Addendum  created 09/10/24 1456 by CA Rodriguez    Intraprocedure Meds edited, Orders acknowledged in Narrator

## 2024-09-10 NOTE — ANESTHESIA POSTPROCEDURE EVALUATION
Patient: Tonio Sebastian    Procedure Summary       Date: 09/10/24 Room / Location: [facility]    Anesthesia Start: 1247 Anesthesia Stop: 1320    Procedure: COLONOSCOPY Diagnosis: Screen for colon cancer    Scheduled Providers: Eliel Gomez DO Responsible Provider: CA Rodriguez    Anesthesia Type: MAC ASA Status: 3            Anesthesia Type: MAC    Vitals Value Taken Time   BP 90/54 09/10/24 1310   Temp 36 °C (96.8 °F) 09/10/24 1310   Pulse 65 09/10/24 1310   Resp 23 09/10/24 1310   SpO2 97 % 09/10/24 1310       Anesthesia Post Evaluation    Patient location during evaluation: bedside  Patient participation: complete - patient participated  Level of consciousness: awake  Pain score: 1  Pain management: adequate  Airway patency: patent  Cardiovascular status: acceptable and hypotensive  Respiratory status: acceptable  Hydration status: hypovolemic  Postoperative Nausea and Vomiting: none        No notable events documented.

## 2024-09-10 NOTE — H&P
Outpatient NR Procedure H&P  Patient Profile  Name Tonio Sebastian  Date of Birth 1950  MRN [id number]  PCP Miguel Stover        Diagnosis: Colon cancer screening.  Procedure(s):  Colonoscopy.    Allergies  No Known Allergies    Past Medical History   Past Medical History:   Diagnosis Date    Personal history of other diseases of the circulatory system 09/23/2021    History of hypertension       Medication Reviewed - yes  Prior to Admission medications    Medication Sig Start Date End Date Taking? Authorizing Provider   aspirin 81 mg EC tablet Take 1 tablet (81 mg) by mouth once daily.    Historical Provider, MD   cholecalciferol (Vitamin D-3) 25 MCG (1000 UT) tablet Take 1 tablet (25 mcg) by mouth once daily.    Historical Provider, MD   lisinopriL-hydrochlorothiazide 10-12.5 mg tablet Take 1 tablet by mouth once daily. 8/18/15   Historical Provider, MD   lisinopriL-hydrochlorothiazide 10-12.5 mg tablet TAKE ONE (1) TABLET BY MOUTH DAILY 11/15/23 11/20/24  Miguel Stover DO   simvastatin (Zocor) 40 mg tablet Take 1 tablet (40 mg) by mouth once daily. 8/18/15   Historical Provider, MD   simvastatin (Zocor) 40 mg tablet TAKE ONE (1) TABLET BY MOUTH DAILY 11/15/23 11/20/24  Miguel Stover DO       Physical Exam  Vitals:    09/10/24 1230   BP: 145/83   Pulse: 82   Resp: 24   Temp: 36.7 °C (98.1 °F)   SpO2: 98%      Weight   Vitals:    09/10/24 1230   Weight: 77.1 kg (170 lb)     BMI Body mass index is 25.1 kg/m².    General: A&Ox3, NAD.  HEENT: AT/NC.   CV: RRR. No murmur.  Resp: CTA bilaterally. No wheezing, rhonchi or rales.   GI: Soft, NT/ND.  Extrem: No edema. Pulses intact.  Skin: No Jaundice.   Neuro: No focal deficits.   Psych: Normal mood and affect.        Oropharyngeal Classification II (hard and soft palate, upper portion of tonsils and uvula visible)  ASA PS Classification 3  Sedation Plan: Deep Sedation.  Procedure Plan - pre-procedural (re)assesment completed by physician:  discharge/transfer  patient when discharge criteria met    Eliel Gomez DO  9/10/2024 12:33 PM

## 2024-09-10 NOTE — ANESTHESIA PREPROCEDURE EVALUATION
Patient: Tonio Sebastian    Procedure Information       Date/Time: 09/10/24 1300    Scheduled providers: Eliel Gomez DO    Procedure: COLONOSCOPY    Location: [facility]            Relevant Problems   Anesthesia (within normal limits)      Cardiac   (+) Essential hypertension   (+) Hyperlipidemia      Pulmonary (within normal limits)      Neuro (within normal limits)      GI (within normal limits)      /Renal (within normal limits)      Liver (within normal limits)      Endocrine (within normal limits)      Hematology (within normal limits)      Musculoskeletal   (+) Localized primary osteoarthritis of wrist      HEENT (within normal limits)      ID (within normal limits)      Skin (within normal limits)      GYN (within normal limits)       Clinical information reviewed:    Allergies                NPO Detail:  NPO/Void Status  Date of Last Liquid: 09/10/24  Time of Last Liquid: 0800  Date of Last Solid: 09/08/24         Physical Exam    Airway  Mallampati: II  TM distance: >3 FB  Neck ROM: full     Cardiovascular   Rhythm: regular  Rate: normal     Dental    Pulmonary   Breath sounds clear to auscultation     Abdominal   Abdomen: soft  Bowel sounds: normal           Anesthesia Plan    History of general anesthesia?: yes  History of complications of general anesthesia?: no    ASA 3     MAC     The patient is not a current smoker.  Patient was not previously instructed to abstain from smoking on day of procedure.  Education provided regarding risk of obstructive sleep apnea.  intravenous induction   Anesthetic plan and risks discussed with patient.    Plan discussed with CRNA.

## 2024-09-13 ENCOUNTER — APPOINTMENT (OUTPATIENT)
Dept: PRIMARY CARE | Facility: CLINIC | Age: 74
End: 2024-09-13
Payer: COMMERCIAL

## 2024-09-13 VITALS
HEART RATE: 60 BPM | BODY MASS INDEX: 25.92 KG/M2 | DIASTOLIC BLOOD PRESSURE: 60 MMHG | TEMPERATURE: 97 F | OXYGEN SATURATION: 98 % | HEIGHT: 69 IN | SYSTOLIC BLOOD PRESSURE: 118 MMHG | WEIGHT: 175 LBS | RESPIRATION RATE: 16 BRPM

## 2024-09-13 DIAGNOSIS — E78.5 HYPERLIPIDEMIA, UNSPECIFIED HYPERLIPIDEMIA TYPE: ICD-10-CM

## 2024-09-13 DIAGNOSIS — E53.8 VITAMIN B12 DEFICIENCY: ICD-10-CM

## 2024-09-13 DIAGNOSIS — E78.2 MIXED HYPERLIPIDEMIA: ICD-10-CM

## 2024-09-13 DIAGNOSIS — Z12.5 SCREENING PSA (PROSTATE SPECIFIC ANTIGEN): ICD-10-CM

## 2024-09-13 DIAGNOSIS — Z00.00 ROUTINE GENERAL MEDICAL EXAMINATION AT A HEALTH CARE FACILITY: Primary | ICD-10-CM

## 2024-09-13 DIAGNOSIS — I10 ESSENTIAL HYPERTENSION: ICD-10-CM

## 2024-09-13 DIAGNOSIS — D64.9 ANEMIA, UNSPECIFIED TYPE: ICD-10-CM

## 2024-09-13 PROCEDURE — 3008F BODY MASS INDEX DOCD: CPT | Performed by: FAMILY MEDICINE

## 2024-09-13 PROCEDURE — 1159F MED LIST DOCD IN RCRD: CPT | Performed by: FAMILY MEDICINE

## 2024-09-13 PROCEDURE — 1157F ADVNC CARE PLAN IN RCRD: CPT | Performed by: FAMILY MEDICINE

## 2024-09-13 PROCEDURE — 99397 PER PM REEVAL EST PAT 65+ YR: CPT | Performed by: FAMILY MEDICINE

## 2024-09-13 PROCEDURE — 3078F DIAST BP <80 MM HG: CPT | Performed by: FAMILY MEDICINE

## 2024-09-13 PROCEDURE — 1160F RVW MEDS BY RX/DR IN RCRD: CPT | Performed by: FAMILY MEDICINE

## 2024-09-13 PROCEDURE — 1036F TOBACCO NON-USER: CPT | Performed by: FAMILY MEDICINE

## 2024-09-13 PROCEDURE — 3074F SYST BP LT 130 MM HG: CPT | Performed by: FAMILY MEDICINE

## 2024-09-13 RX ORDER — FERROUS SULFATE 325(65) MG
325 TABLET ORAL
COMMUNITY

## 2024-09-13 RX ORDER — SIMVASTATIN 40 MG/1
40 TABLET, FILM COATED ORAL DAILY
Qty: 90 TABLET | Refills: 1 | Status: SHIPPED | OUTPATIENT
Start: 2024-09-13 | End: 2025-09-12

## 2024-09-13 RX ORDER — LISINOPRIL AND HYDROCHLOROTHIAZIDE 10; 12.5 MG/1; MG/1
1 TABLET ORAL DAILY
Qty: 90 TABLET | Refills: 1 | Status: SHIPPED | OUTPATIENT
Start: 2024-09-13 | End: 2025-09-12

## 2024-09-13 ASSESSMENT — PATIENT HEALTH QUESTIONNAIRE - PHQ9
1. LITTLE INTEREST OR PLEASURE IN DOING THINGS: NOT AT ALL
SUM OF ALL RESPONSES TO PHQ9 QUESTIONS 1 AND 2: 0
2. FEELING DOWN, DEPRESSED OR HOPELESS: NOT AT ALL

## 2024-09-13 NOTE — PROGRESS NOTES
Subjective   Patient ID: Tonio Sebastian is a 73 y.o. male who presents for Establish Care, Annual Exam, Hypertension, and Hyperlipidemia.    HPI    Patient presents today to establish care. Last PCP was Dr Stover. Last saw PCP 1 years ago. [place] about us through wife.     Also presents today for annual exam, HTN, HLD follow up. Does eat a low sodium, low cholesterol diet. Does exercise. He does check his BP at home. Last eye exam was 2 yrs ago . Last dental exam was 2 month ago. Is/not fasting for BW. Denies any SOB or chest pain. Denies any acid reflux.     Refuse Flu vaccine today on 9/13/24    Advanced Care Planning  Tonio Sebastian and I discussed their advance care plan preferences such as living will, and durable health care power of , which include: yes Attempt Resuscitation, no Intubate & Ventilate, no Comfort Care or Life- Sustaning Care. I reminded patient to talk with their health care agent, Alicia Sebastian, about their health care goals. Note reflects patient's free will and care goals as expressed to me.     Colonoscopy due 2025    No there concern /question     Review of systems  ; Patient seen today for exam denies any problems with headaches or vision, denies any shortness of breath chest pain nausea or vomiting, no black stool no blood in the stool no heartburn type symptoms denies any problems with constipation or diarrhea, and no dysuria-type symptoms    The patient's allergies medications were reviewed with them today    The patient's social family and surgical history or also reviewed here today, along with her past medical history.     Objective     Alert and active in  no acute distress  HEENT TMs clear oropharynx negative nares clear no drainage noted neck supple  With no adenopathy   Heart regular rate and rhythm without murmur and no carotid bruits  Lungs- clear to auscultation bilaterally, no wheeze or rhonchi noted  Thyroid -negative masses or nodularity  Abdomen- soft times four  "quadrants , bowel sounds positive no masses or organomegaly, negative tenderness guarding or rebound  Neurological exam unremarkable- DTRs in upper and lower extremities within normal limits.   skin -no lesions noted      /60 (BP Location: Left arm, Patient Position: Sitting, BP Cuff Size: Adult)   Pulse 60   Temp 36.1 °C (97 °F) (Temporal)   Resp 16   Ht 1.753 m (5' 9\")   Wt 79.4 kg (175 lb)   SpO2 98%   BMI 25.84 kg/m²     No Known Allergies    Assessment/Plan   Problem List Items Addressed This Visit       Essential hypertension    Relevant Medications    lisinopriL-hydrochlorothiazide 10-12.5 mg tablet    Other Relevant Orders    Comprehensive Metabolic Panel    Hyperlipidemia    Relevant Medications    simvastatin (Zocor) 40 mg tablet    Other Relevant Orders    Lipid Panel    Screening PSA (prostate specific antigen)    Relevant Orders    Prostate Specific Antigen, Screen    BMI 25.0-25.9,adult     Other Visit Diagnoses       Routine general medical examination at a health care facility    -  Primary    Anemia, unspecified type        Relevant Orders    CBC and Auto Differential    Iron and TIBC    Ferritin    Vitamin B12 deficiency        Relevant Orders    Vitamin B12          Refill Lisinopril-hydrochlorothiazide, Simvastatin.     Recommend he get the COVID vaccine in the fall.     Recommend taking Aspirin with food.     Labs have been ordered, she/he will have these performed and we will contact her/him with results.  (CBC, CMP, Lipid, PSA, Iron & TIBC, Ferritin, Vitamin B12)    If anything worsens or changes please call us at once, follow up in the office as planned.    Scribe Attestation  By signing my name below, I, Marialuisa Levine MA, Scribe   attest that this documentation has been prepared under the direction and in the presence of Carlos Villasenor DO.      "

## 2024-09-14 ENCOUNTER — LAB (OUTPATIENT)
Dept: LAB | Facility: LAB | Age: 74
End: 2024-09-14
Payer: COMMERCIAL

## 2024-09-14 DIAGNOSIS — I10 ESSENTIAL HYPERTENSION: ICD-10-CM

## 2024-09-14 DIAGNOSIS — E53.8 VITAMIN B12 DEFICIENCY: ICD-10-CM

## 2024-09-14 DIAGNOSIS — D64.9 ANEMIA, UNSPECIFIED TYPE: ICD-10-CM

## 2024-09-14 DIAGNOSIS — Z12.5 SCREENING PSA (PROSTATE SPECIFIC ANTIGEN): ICD-10-CM

## 2024-09-14 DIAGNOSIS — E78.2 MIXED HYPERLIPIDEMIA: ICD-10-CM

## 2024-09-14 LAB
ALBUMIN SERPL BCP-MCNC: 4.5 G/DL (ref 3.4–5)
ALP SERPL-CCNC: 57 U/L (ref 33–136)
ALT SERPL W P-5'-P-CCNC: 16 U/L (ref 10–52)
ANION GAP SERPL CALC-SCNC: 13 MMOL/L (ref 10–20)
AST SERPL W P-5'-P-CCNC: 18 U/L (ref 9–39)
BASOPHILS # BLD AUTO: 0.02 X10*3/UL (ref 0–0.1)
BASOPHILS NFR BLD AUTO: 0.5 %
BILIRUB SERPL-MCNC: 1.1 MG/DL (ref 0–1.2)
BUN SERPL-MCNC: 21 MG/DL (ref 6–23)
CALCIUM SERPL-MCNC: 9.5 MG/DL (ref 8.6–10.3)
CHLORIDE SERPL-SCNC: 102 MMOL/L (ref 98–107)
CHOLEST SERPL-MCNC: 179 MG/DL (ref 0–199)
CHOLESTEROL/HDL RATIO: 2.6
CO2 SERPL-SCNC: 26 MMOL/L (ref 21–32)
CREAT SERPL-MCNC: 1.04 MG/DL (ref 0.5–1.3)
EGFRCR SERPLBLD CKD-EPI 2021: 76 ML/MIN/1.73M*2
EOSINOPHIL # BLD AUTO: 0.05 X10*3/UL (ref 0–0.4)
EOSINOPHIL NFR BLD AUTO: 1.3 %
ERYTHROCYTE [DISTWIDTH] IN BLOOD BY AUTOMATED COUNT: 12.7 % (ref 11.5–14.5)
FERRITIN SERPL-MCNC: 689 NG/ML (ref 20–300)
GLUCOSE SERPL-MCNC: 74 MG/DL (ref 74–99)
HCT VFR BLD AUTO: 40.4 % (ref 41–52)
HDLC SERPL-MCNC: 68.1 MG/DL
HGB BLD-MCNC: 13.3 G/DL (ref 13.5–17.5)
IMM GRANULOCYTES # BLD AUTO: 0.01 X10*3/UL (ref 0–0.5)
IMM GRANULOCYTES NFR BLD AUTO: 0.3 % (ref 0–0.9)
IRON SATN MFR SERPL: 67 % (ref 25–45)
IRON SERPL-MCNC: 190 UG/DL (ref 35–150)
LDLC SERPL CALC-MCNC: 88 MG/DL
LYMPHOCYTES # BLD AUTO: 1.07 X10*3/UL (ref 0.8–3)
LYMPHOCYTES NFR BLD AUTO: 27.1 %
MCH RBC QN AUTO: 32.9 PG (ref 26–34)
MCHC RBC AUTO-ENTMCNC: 32.9 G/DL (ref 32–36)
MCV RBC AUTO: 100 FL (ref 80–100)
MONOCYTES # BLD AUTO: 0.47 X10*3/UL (ref 0.05–0.8)
MONOCYTES NFR BLD AUTO: 11.9 %
NEUTROPHILS # BLD AUTO: 2.33 X10*3/UL (ref 1.6–5.5)
NEUTROPHILS NFR BLD AUTO: 58.9 %
NON HDL CHOLESTEROL: 111 MG/DL (ref 0–149)
NRBC BLD-RTO: 0 /100 WBCS (ref 0–0)
PLATELET # BLD AUTO: 220 X10*3/UL (ref 150–450)
POTASSIUM SERPL-SCNC: 5.3 MMOL/L (ref 3.5–5.3)
PROT SERPL-MCNC: 7 G/DL (ref 6.4–8.2)
PSA SERPL-MCNC: 1.53 NG/ML
RBC # BLD AUTO: 4.04 X10*6/UL (ref 4.5–5.9)
SODIUM SERPL-SCNC: 136 MMOL/L (ref 136–145)
TIBC SERPL-MCNC: 284 UG/DL (ref 240–445)
TRIGL SERPL-MCNC: 117 MG/DL (ref 0–149)
UIBC SERPL-MCNC: 94 UG/DL (ref 110–370)
VIT B12 SERPL-MCNC: 357 PG/ML (ref 211–911)
VLDL: 23 MG/DL (ref 0–40)
WBC # BLD AUTO: 4 X10*3/UL (ref 4.4–11.3)

## 2024-09-14 PROCEDURE — 84153 ASSAY OF PSA TOTAL: CPT

## 2024-09-14 PROCEDURE — 82607 VITAMIN B-12: CPT

## 2024-09-14 PROCEDURE — 83550 IRON BINDING TEST: CPT

## 2024-09-14 PROCEDURE — 36415 COLL VENOUS BLD VENIPUNCTURE: CPT

## 2024-09-14 PROCEDURE — 82728 ASSAY OF FERRITIN: CPT

## 2024-09-14 PROCEDURE — 85025 COMPLETE CBC W/AUTO DIFF WBC: CPT

## 2024-09-14 PROCEDURE — 80061 LIPID PANEL: CPT

## 2024-09-14 PROCEDURE — 83540 ASSAY OF IRON: CPT

## 2024-09-14 PROCEDURE — 80053 COMPREHEN METABOLIC PANEL: CPT

## 2024-09-18 LAB
LABORATORY COMMENT REPORT: NORMAL
PATH REPORT.FINAL DX SPEC: NORMAL
PATH REPORT.GROSS SPEC: NORMAL
PATH REPORT.TOTAL CANCER: NORMAL
RESIDENT REVIEW: NORMAL

## 2024-11-19 ENCOUNTER — SPECIALTY PHARMACY (OUTPATIENT)
Dept: PHARMACY | Facility: CLINIC | Age: 74
End: 2024-11-19

## 2024-11-22 DIAGNOSIS — E78.5 HYPERLIPIDEMIA, UNSPECIFIED HYPERLIPIDEMIA TYPE: ICD-10-CM

## 2024-11-22 DIAGNOSIS — I10 ESSENTIAL HYPERTENSION: ICD-10-CM

## 2024-11-22 RX ORDER — LISINOPRIL AND HYDROCHLOROTHIAZIDE 10; 12.5 MG/1; MG/1
1 TABLET ORAL DAILY
Qty: 90 TABLET | Refills: 1 | Status: SHIPPED | OUTPATIENT
Start: 2024-11-22 | End: 2025-11-21

## 2024-11-22 RX ORDER — SIMVASTATIN 40 MG/1
40 TABLET, FILM COATED ORAL DAILY
Qty: 90 TABLET | Refills: 1 | Status: SHIPPED | OUTPATIENT
Start: 2024-11-22 | End: 2025-11-21

## 2024-12-02 PROCEDURE — RXMED WILLOW AMBULATORY MEDICATION CHARGE

## 2024-12-05 ENCOUNTER — PHARMACY VISIT (OUTPATIENT)
Dept: PHARMACY | Facility: CLINIC | Age: 74
End: 2024-12-05
Payer: COMMERCIAL

## 2024-12-09 ENCOUNTER — APPOINTMENT (OUTPATIENT)
Dept: OPHTHALMOLOGY | Facility: CLINIC | Age: 74
End: 2024-12-09
Payer: COMMERCIAL

## 2024-12-09 DIAGNOSIS — H52.4 PRESBYOPIA: ICD-10-CM

## 2024-12-09 DIAGNOSIS — H52.13 MYOPIA OF BOTH EYES: Primary | ICD-10-CM

## 2024-12-09 DIAGNOSIS — H02.88B MEIBOMIAN GLAND DYSFUNCTION LEFT EYE, UPPER AND LOWER EYELIDS: ICD-10-CM

## 2024-12-09 DIAGNOSIS — H02.88A MEIBOMIAN GLAND DYSFUNCTION RIGHT EYE, UPPER AND LOWER EYELIDS: ICD-10-CM

## 2024-12-09 DIAGNOSIS — H25.13 NUCLEAR SCLEROTIC CATARACT OF BOTH EYES: ICD-10-CM

## 2024-12-09 PROBLEM — R53.83 FATIGUE: Status: ACTIVE | Noted: 2024-12-09

## 2024-12-09 PROCEDURE — 92014 COMPRE OPH EXAM EST PT 1/>: CPT | Performed by: OPTOMETRIST

## 2024-12-09 PROCEDURE — 92015 DETERMINE REFRACTIVE STATE: CPT | Performed by: OPTOMETRIST

## 2024-12-09 ASSESSMENT — CONF VISUAL FIELD
OD_INFERIOR_TEMPORAL_RESTRICTION: 0
OS_SUPERIOR_NASAL_RESTRICTION: 0
OD_SUPERIOR_NASAL_RESTRICTION: 0
OD_INFERIOR_NASAL_RESTRICTION: 0
OS_INFERIOR_TEMPORAL_RESTRICTION: 0
OS_SUPERIOR_TEMPORAL_RESTRICTION: 0
OD_SUPERIOR_TEMPORAL_RESTRICTION: 0
OS_NORMAL: 1
METHOD: COUNTING FINGERS
OS_INFERIOR_NASAL_RESTRICTION: 0
OD_NORMAL: 1

## 2024-12-09 ASSESSMENT — TONOMETRY
OD_IOP_MMHG: 16
IOP_METHOD: GOLDMANN APPLANATION
OS_IOP_MMHG: 18

## 2024-12-09 ASSESSMENT — CUP TO DISC RATIO
OD_RATIO: 0.2
OS_RATIO: 0.2

## 2024-12-09 ASSESSMENT — ENCOUNTER SYMPTOMS
CARDIOVASCULAR NEGATIVE: 0
EYES NEGATIVE: 1
GASTROINTESTINAL NEGATIVE: 0
RESPIRATORY NEGATIVE: 0
ALLERGIC/IMMUNOLOGIC NEGATIVE: 0
ENDOCRINE NEGATIVE: 0
HEMATOLOGIC/LYMPHATIC NEGATIVE: 0
MUSCULOSKELETAL NEGATIVE: 0
CONSTITUTIONAL NEGATIVE: 0
NEUROLOGICAL NEGATIVE: 0
PSYCHIATRIC NEGATIVE: 0

## 2024-12-09 ASSESSMENT — REFRACTION_MANIFEST
OS_SPHERE: -0.50
OS_CYLINDER: -0.50
OD_ADD: +2.75
OS_AXIS: 090
OD_SPHERE: -0.50
OS_ADD: +2.75

## 2024-12-09 ASSESSMENT — REFRACTION_WEARINGRX
OS_SPHERE: -0.50
OS_CYLINDER: -0.50
OS_AXIS: 090
OD_SPHERE: -0.50

## 2024-12-09 ASSESSMENT — EXTERNAL EXAM - LEFT EYE: OS_EXAM: NORMAL

## 2024-12-09 ASSESSMENT — REFRACTION
OS_AXIS: 090
OS_SPHERE: -0.50
OS_CYLINDER: -0.75
OD_SPHERE: -0.50

## 2024-12-09 ASSESSMENT — VISUAL ACUITY
OD_CC: J1+
OD_SC: 20/25
OD_BAT_MED: 20/50
OS_BAT_MED: 20/100
METHOD: SNELLEN - LINEAR
OS_CC: J1+
OS_SC: 20/30-2

## 2024-12-09 ASSESSMENT — EXTERNAL EXAM - RIGHT EYE: OD_EXAM: NORMAL

## 2024-12-09 ASSESSMENT — SLIT LAMP EXAM - LIDS
COMMENTS: 2+ MGD
COMMENTS: 2+ MGD

## 2024-12-09 NOTE — PROGRESS NOTES
Assessment/Plan   Diagnoses and all orders for this visit:  Myopia of both eyes  Presbyopia  New spec rx released today per patient request. Ocular health wnl for age OU. Monitor 1 year or sooner prn. Refraction billed today. Pt consents to receiving glasses Rx today. Patient's/guardian's signature obtained to acknowledge and confirm that a paper copy of glasses Rx was given to patient in compliance with [facility] Eyeglass Rule. Electronic copy of Rx will also be available via [email or web address].       Nuclear sclerotic cataract of both eyes  Patient's cataracts are visually significant at this time. Options for surgical referral discussed. Pt would like to wait on referral. Will monitor for changes.      Meibomian gland dysfunction right eye, upper and lower eyelids  Meibomian gland dysfunction left eye, upper and lower eyelids  Recommend hot compresses with lid massage bid OU. Recommend lid cleansing qhs OU. Discussed that this is a chronic problem which requires chronic/consistent treatment.     RTC in 1year for CEE + MRX or sooner prn.

## 2024-12-23 ENCOUNTER — TELEMEDICINE (OUTPATIENT)
Dept: PRIMARY CARE | Facility: CLINIC | Age: 74
End: 2024-12-23
Payer: COMMERCIAL

## 2024-12-23 DIAGNOSIS — J22 LOWER RESPIRATORY INFECTION (E.G., BRONCHITIS, PNEUMONIA, PNEUMONITIS, PULMONITIS): Primary | ICD-10-CM

## 2024-12-23 PROCEDURE — 1159F MED LIST DOCD IN RCRD: CPT

## 2024-12-23 PROCEDURE — 1157F ADVNC CARE PLAN IN RCRD: CPT

## 2024-12-23 PROCEDURE — 99213 OFFICE O/P EST LOW 20 MIN: CPT

## 2024-12-23 PROCEDURE — 1036F TOBACCO NON-USER: CPT

## 2024-12-23 PROCEDURE — 1160F RVW MEDS BY RX/DR IN RCRD: CPT

## 2024-12-23 RX ORDER — AZITHROMYCIN 250 MG/1
TABLET, FILM COATED ORAL
Qty: 6 TABLET | Refills: 0 | Status: SHIPPED | OUTPATIENT
Start: 2024-12-23 | End: 2024-12-28

## 2024-12-23 ASSESSMENT — COPD QUESTIONNAIRES: COPD: 0

## 2024-12-23 ASSESSMENT — ENCOUNTER SYMPTOMS
COUGH: 1
LIGHT-HEADEDNESS: 0
RHINORRHEA: 1
VOMITING: 0
FEVER: 0
DIZZINESS: 0
SHORTNESS OF BREATH: 0
SORE THROAT: 1
CHILLS: 0
NAUSEA: 0
MYALGIAS: 0
HEADACHES: 1

## 2024-12-23 NOTE — PROGRESS NOTES
Subjective   Patient ID: Tonio Sebastian is a 74 y.o. male who presents for cough.     With patient's permission, this is a Telemedicine visit with video and audio. Provider located at office address. Patient located at their home address. All issues as documented below were discussed and addressed but limited physical exam was performed. If it was felt that the patient should be evaluated via face-to-face office appointment(s) they were directed to appropriate location.     Cough  This is a new problem. The current episode started in the past 7 days. The problem has been unchanged. The cough is Productive of sputum. Associated symptoms include headaches, rhinorrhea and a sore throat. Pertinent negatives include no chest pain, chills, ear congestion, ear pain, fever, myalgias, nasal congestion, postnasal drip or shortness of breath. Nothing aggravates the symptoms. He has tried OTC cough suppressant for the symptoms. The treatment provided mild relief. There is no history of asthma or COPD.   Daughters are sick. No COVID testing.     Review of Systems   Constitutional:  Negative for chills and fever.   HENT:  Positive for rhinorrhea and sore throat. Negative for ear pain, postnasal drip and sneezing.    Respiratory:  Positive for cough. Negative for shortness of breath.    Cardiovascular:  Negative for chest pain.   Gastrointestinal:  Negative for nausea and vomiting.   Musculoskeletal:  Negative for myalgias.   Neurological:  Positive for headaches. Negative for dizziness and light-headedness.     Objective   There were no vitals taken for this visit.    Physical Exam  Limited due to virtual encounter.   General:  Appears alert and oriented and well-nourished with no signs of acute distress.   Face:  Normal appearing with no obvious neurological deficits.  Eyes:  EOMI x2.  Respiratory:  Breathing is non-labored.   Psychiatric:  Affect is appropriate and shows good judgment.     Assessment/Plan   Assessment &  Plan  Lower respiratory infection (e.g., bronchitis, pneumonia, pneumonitis, pulmonitis)  Acute. Suspect bronchitis.   Azithromycin as directed. Risks and benefits of medication discussed and prescribed.   OTC Tylenol/Ibuprofen as directed for headaches, sore throat. OTC antihistamine as directed for sinus drainage, PND. OTC Mucinex as directed for productive cough. Increase fluids, rest, humidifier.   Follow up with PCP/UC if symptoms do not improve within 7-10 days, or sooner for worsening.     Orders:    azithromycin (Zithromax) 250 mg tablet; Take 2 tablets (500 mg) by mouth once daily for 1 day, THEN 1 tablet (250 mg) once daily for 4 days. Take 2 tabs (500 mg) by mouth today, than 1 daily for 4 days..

## 2025-04-04 ENCOUNTER — SPECIALTY PHARMACY (OUTPATIENT)
Dept: PHARMACY | Facility: CLINIC | Age: 75
End: 2025-04-04

## 2025-04-04 PROCEDURE — RXMED WILLOW AMBULATORY MEDICATION CHARGE

## 2025-04-05 ENCOUNTER — PHARMACY VISIT (OUTPATIENT)
Dept: PHARMACY | Facility: CLINIC | Age: 75
End: 2025-04-05
Payer: COMMERCIAL

## 2025-04-11 NOTE — PROGRESS NOTES
Subjective   Patient ID: Tonio Sebastian is a 74 y.o. male who presents for Hyperlipidemia and Hypertension.  HPI    Patient presents today for a follow-up on Hypertension and Hyperlipidemia. Does try follow a low sugar and low fat diet. Does not check BP at home. Does exercise. Eye exam was up-to-date.    Last BW was on 9/14/24 showing high iron level but low blood count. He takes iron supplement. He is also taking zinc, Vitamin B12, and vitamin C supplements. Not taking Vitamin D.     Patient complaints of a wart on his lower left back. Patient would like to have that removed.     Patient overall doing well. No concerns or complaints.     Last colonoscopy was on 9/10/24, 2 subcentimeter polyps was removed. On 5-year plan.    Immunizations were reviewed. Age-appropriate vaccines are all up-to-date.    He is planning to go on a trip with family.      Review of systems  ; Patient seen today for exam denies any problems with headaches or vision, denies any shortness of breath chest pain nausea or vomiting, no black stool no blood in the stool no heartburn type symptoms denies any problems with constipation or diarrhea, and no dysuria-type symptoms    The patient's allergies medications were reviewed with them today    The patient's social family and surgical history or also reviewed here today, along with her past medical history.     Objective     Alert and active in  no acute distress  HEENT TMs clear oropharynx negative nares clear no drainage noted neck supple  With no adenopathy   Heart regular rate and rhythm without murmur and no carotid bruits  Lungs- clear to auscultation bilaterally, no wheeze or rhonchi noted  Thyroid -negative masses or nodularity  Abdomen- soft times four quadrants , bowel sounds positive no masses or organomegaly, negative tenderness guarding or rebound    skin -no lesions noted      /70 (BP Location: Right arm, Patient Position: Sitting, BP Cuff Size: Adult)   Temp 36.6 °C (97.8  "°F) (Temporal)   Ht 1.753 m (5' 9\")   Wt 83.6 kg (184 lb 6.4 oz)   BMI 27.23 kg/m²     No Known Allergies    Assessment/Plan   Problem List Items Addressed This Visit    None  Visit Diagnoses         Iron deficiency anemia due to chronic blood loss    -  Primary    Relevant Orders    CBC and Auto Differential (Completed)    Ferritin (Completed)    Folate (Completed)    Iron and TIBC (Completed)      BMI 27.0-27.9,adult          Malaise        Relevant Orders    TSH with reflex to Free T4 if abnormal (Completed)      Vitamin D deficiency        Relevant Orders    Vitamin D 25-Hydroxy,Total (for eval of Vitamin D levels) (Completed)      B12 deficiency        Relevant Orders    Vitamin B12 (Completed)      Seborrheic keratosis          Anxiety        Relevant Medications    ALPRAZolam (Xanax) 0.5 mg tablet            Patient is overall doing well with the current medication regimen and will continue with the medications.    We frozen the wart on his left lower back that is consistent with seborrheic keratosis.    Reviewed CBC, Iron level.   Recommended getting a second opinion on his iron levels given that he has a high iron level but a low blood count.    Labs have been ordered, she/he will have these performed and we will contact her/him with results.  (CBC, TSH, Vitamin D, Vitamin B12, Folate, Ferritin, Iron & TIBC)    Prescribed Xanax 0.5 mg today for flight anxiety. Take 30-40 mins prior to flight.    Recommended Arexvy for RSV vaccine.    If anything worsens or changes please call us at once, follow up in the office as planned,       Scribe Attestation  By signing my name below, IRoxanne Scribe   attest that this documentation has been prepared under the direction and in the presence of Carlos Villasenor DO.    All medical record entries made by the Scribe were at my direction and personally dictated by me.   I have reviewed the chart and agree that the record accurately reflects my personal " performance of the history, physical exam, discussion, and plan.

## 2025-04-17 ENCOUNTER — APPOINTMENT (OUTPATIENT)
Dept: PRIMARY CARE | Facility: CLINIC | Age: 75
End: 2025-04-17
Payer: COMMERCIAL

## 2025-04-17 VITALS
DIASTOLIC BLOOD PRESSURE: 70 MMHG | WEIGHT: 184.4 LBS | TEMPERATURE: 97.8 F | HEIGHT: 69 IN | SYSTOLIC BLOOD PRESSURE: 130 MMHG | BODY MASS INDEX: 27.31 KG/M2

## 2025-04-17 DIAGNOSIS — D50.0 IRON DEFICIENCY ANEMIA DUE TO CHRONIC BLOOD LOSS: Primary | ICD-10-CM

## 2025-04-17 DIAGNOSIS — R53.81 MALAISE: ICD-10-CM

## 2025-04-17 DIAGNOSIS — F41.9 ANXIETY: ICD-10-CM

## 2025-04-17 DIAGNOSIS — E55.9 VITAMIN D DEFICIENCY: ICD-10-CM

## 2025-04-17 DIAGNOSIS — E53.8 B12 DEFICIENCY: ICD-10-CM

## 2025-04-17 DIAGNOSIS — L82.1 SEBORRHEIC KERATOSIS: ICD-10-CM

## 2025-04-17 LAB
25(OH)D3+25(OH)D2 SERPL-MCNC: 41 NG/ML (ref 30–100)
BASOPHILS # BLD AUTO: 31 CELLS/UL (ref 0–200)
BASOPHILS NFR BLD AUTO: 0.6 %
EOSINOPHIL # BLD AUTO: 68 CELLS/UL (ref 15–500)
EOSINOPHIL NFR BLD AUTO: 1.3 %
ERYTHROCYTE [DISTWIDTH] IN BLOOD BY AUTOMATED COUNT: 12.5 % (ref 11–15)
FERRITIN SERPL-MCNC: 282 NG/ML (ref 24–380)
FOLATE SERPL-MCNC: >24 NG/ML
HCT VFR BLD AUTO: 40.9 % (ref 38.5–50)
HGB BLD-MCNC: 13.7 G/DL (ref 13.2–17.1)
IRON SATN MFR SERPL: 34 % (CALC) (ref 20–48)
IRON SERPL-MCNC: 100 MCG/DL (ref 50–180)
LYMPHOCYTES # BLD AUTO: 1212 CELLS/UL (ref 850–3900)
LYMPHOCYTES NFR BLD AUTO: 23.3 %
MCH RBC QN AUTO: 32.2 PG (ref 27–33)
MCHC RBC AUTO-ENTMCNC: 33.5 G/DL (ref 32–36)
MCV RBC AUTO: 96.2 FL (ref 80–100)
MONOCYTES # BLD AUTO: 562 CELLS/UL (ref 200–950)
MONOCYTES NFR BLD AUTO: 10.8 %
NEUTROPHILS # BLD AUTO: 3328 CELLS/UL (ref 1500–7800)
NEUTROPHILS NFR BLD AUTO: 64 %
PLATELET # BLD AUTO: 207 THOUSAND/UL (ref 140–400)
PMV BLD REES-ECKER: 12 FL (ref 7.5–12.5)
RBC # BLD AUTO: 4.25 MILLION/UL (ref 4.2–5.8)
TIBC SERPL-MCNC: 290 MCG/DL (CALC) (ref 250–425)
TSH SERPL-ACNC: 3.68 MIU/L (ref 0.4–4.5)
VIT B12 SERPL-MCNC: 1182 PG/ML (ref 200–1100)
WBC # BLD AUTO: 5.2 THOUSAND/UL (ref 3.8–10.8)

## 2025-04-17 PROCEDURE — 1160F RVW MEDS BY RX/DR IN RCRD: CPT | Performed by: FAMILY MEDICINE

## 2025-04-17 PROCEDURE — 99214 OFFICE O/P EST MOD 30 MIN: CPT | Performed by: FAMILY MEDICINE

## 2025-04-17 PROCEDURE — 3078F DIAST BP <80 MM HG: CPT | Performed by: FAMILY MEDICINE

## 2025-04-17 PROCEDURE — 1159F MED LIST DOCD IN RCRD: CPT | Performed by: FAMILY MEDICINE

## 2025-04-17 PROCEDURE — 1157F ADVNC CARE PLAN IN RCRD: CPT | Performed by: FAMILY MEDICINE

## 2025-04-17 PROCEDURE — 3075F SYST BP GE 130 - 139MM HG: CPT | Performed by: FAMILY MEDICINE

## 2025-04-17 PROCEDURE — 3008F BODY MASS INDEX DOCD: CPT | Performed by: FAMILY MEDICINE

## 2025-04-17 RX ORDER — ALPRAZOLAM 0.5 MG/1
TABLET ORAL
Qty: 30 TABLET | Refills: 0 | Status: SHIPPED | OUTPATIENT
Start: 2025-04-17

## 2025-04-17 ASSESSMENT — PATIENT HEALTH QUESTIONNAIRE - PHQ9
SUM OF ALL RESPONSES TO PHQ9 QUESTIONS 1 AND 2: 0
1. LITTLE INTEREST OR PLEASURE IN DOING THINGS: NOT AT ALL
2. FEELING DOWN, DEPRESSED OR HOPELESS: NOT AT ALL

## 2025-06-02 DIAGNOSIS — E78.5 HYPERLIPIDEMIA, UNSPECIFIED HYPERLIPIDEMIA TYPE: ICD-10-CM

## 2025-06-02 DIAGNOSIS — I10 ESSENTIAL HYPERTENSION: ICD-10-CM

## 2025-06-02 RX ORDER — LISINOPRIL AND HYDROCHLOROTHIAZIDE 10; 12.5 MG/1; MG/1
1 TABLET ORAL DAILY
Qty: 90 TABLET | Refills: 1 | Status: SHIPPED | OUTPATIENT
Start: 2025-06-02 | End: 2026-06-01

## 2025-06-02 RX ORDER — SIMVASTATIN 40 MG/1
40 TABLET, FILM COATED ORAL DAILY
Qty: 90 TABLET | Refills: 1 | Status: SHIPPED | OUTPATIENT
Start: 2025-06-02 | End: 2026-06-01

## 2025-06-25 ENCOUNTER — SPECIALTY PHARMACY (OUTPATIENT)
Dept: PHARMACY | Facility: CLINIC | Age: 75
End: 2025-06-25

## 2025-06-25 PROCEDURE — RXMED WILLOW AMBULATORY MEDICATION CHARGE

## 2025-06-27 ENCOUNTER — PHARMACY VISIT (OUTPATIENT)
Dept: PHARMACY | Facility: CLINIC | Age: 75
End: 2025-06-27
Payer: COMMERCIAL

## 2025-07-02 ENCOUNTER — PHARMACY VISIT (OUTPATIENT)
Dept: PHARMACY | Facility: CLINIC | Age: 75
End: 2025-07-02
Payer: COMMERCIAL

## 2025-07-24 ENCOUNTER — APPOINTMENT (OUTPATIENT)
Dept: DERMATOLOGY | Facility: CLINIC | Age: 75
End: 2025-07-24
Payer: COMMERCIAL

## 2025-07-24 DIAGNOSIS — D22.9 MELANOCYTIC NEVUS, UNSPECIFIED LOCATION: Primary | ICD-10-CM

## 2025-07-24 DIAGNOSIS — Z12.83 ENCOUNTER FOR SCREENING FOR MALIGNANT NEOPLASM OF SKIN: ICD-10-CM

## 2025-07-24 DIAGNOSIS — L82.1 SEBORRHEIC KERATOSIS: ICD-10-CM

## 2025-07-24 DIAGNOSIS — D18.01 HEMANGIOMA OF SKIN: ICD-10-CM

## 2025-07-24 DIAGNOSIS — L57.0 ACTINIC KERATOSIS: ICD-10-CM

## 2025-07-24 DIAGNOSIS — L81.4 LENTIGO: ICD-10-CM

## 2025-07-24 PROCEDURE — 17000 DESTRUCT PREMALG LESION: CPT | Performed by: DERMATOLOGY

## 2025-07-24 PROCEDURE — 1159F MED LIST DOCD IN RCRD: CPT | Performed by: DERMATOLOGY

## 2025-07-24 PROCEDURE — 99213 OFFICE O/P EST LOW 20 MIN: CPT | Performed by: DERMATOLOGY

## 2025-07-24 PROCEDURE — 1036F TOBACCO NON-USER: CPT | Performed by: DERMATOLOGY

## 2025-07-24 PROCEDURE — 17003 DESTRUCT PREMALG LES 2-14: CPT | Performed by: DERMATOLOGY

## 2025-07-24 NOTE — PROGRESS NOTES
Subjective     Tonio Sebastian is a 74 y.o. male who presents for the following: Skin Check (Annual. Hx of AK. Pt reports area of concern on Left Lower Back. ).     Skin Cancer Screening  He has a history of heavy sun exposure. He is in the sun daily. He uses sunscreen occasionally. He reports no skin symptoms. His moles are not changing.    Spots that concern him: Left Lower Back.       Hx of AK    Review of Systems:  No other skin or systemic complaints other than what is documented elsewhere in the note.    The following portions of the chart were reviewed this encounter and updated as appropriate:       Skin Cancer History  Biopsy Log Book  No skin cancers from Specimen Tracking.    Additional History      Specialty Problems    None    Past Medical History:  Tonio Sebastian  has a past medical history of Personal history of other diseases of the circulatory system (09/23/2021).    Past Surgical History:  Tonio Sebastian  has a past surgical history that includes Other surgical history (08/26/2020) and Other surgical history (08/26/2020).    Family History:  Patient family history includes Breast cancer in his mother.       Objective   Well appearing patient in no apparent distress; mood and affect are within normal limits.    A full examination was performed including scalp, head, eyes, ears, nose, lips, neck, chest, axillae, abdomen, back, buttocks, bilateral upper extremities, bilateral lower extremities, hands, feet, fingers, toes, fingernails, and toenails. All findings within normal limits unless otherwise noted below.    Assessment/Plan   Skin Exam  1. HEMANGIOMA OF SKIN  Generalized  Scattered cherry-red papule(s).  2. MELANOCYTIC NEVUS, UNSPECIFIED LOCATION  Generalized  All nevi were symmetric brown macules without atypia on dermoscopy.   The ABCDEs of melanoma and warning signs of non-melanoma skin cancer were discussed with patient and patient expressed understanding. Sun protection and use of at least  SPF 30 discussed with patient. Pt instructed to reapply every 2 hours.   3. LENTIGO  Generalized  Scattered tan macules in sun-exposed areas.  The ABCDEs of melanoma and warning signs of non-melanoma skin cancer were discussed with patient and patient expressed understanding. Sun protection and use of at least SPF 30 discussed with patient. Pt instructed to reapply every 2 hours.   4. SEBORRHEIC KERATOSIS  Generalized  Stuck on verrucous, tan-brown papules and plaques.    5. ACTINIC KERATOSIS (3)  Right [place], Scalp (2)  Destruction of Actinic Keratosis Procedure Note  Diagnosis: AK  Location: see physical exam  Informed consent: Discussed risks (permanent scarring, light or dark discoloration, infection, pain, bleeding, bruising, redness, blister formation, and recurrence of the lesion) and benefits of the procedure, as well as the alternatives.  Informed consent was obtained.  Anesthesia: not required  Procedure Details:  The lesion was destroyed with liquid nitrogen. The patient tolerated procedure well.  Total number of lesions treated:  3  Plan:  The patient was instructed on post-op care. Recommend OTC analgesia as needed for pain.    - Destr of lesion - Right [place], Scalp (2)  Complexity: simple    Destruction method: cryotherapy    Informed consent: discussed and consent obtained    Lesion destroyed using liquid nitrogen: Yes    Region frozen until ice ball extended beyond lesion: Yes    Cryotherapy cycles:  1  Outcome: patient tolerated procedure well with no complications    Post-procedure details: wound care instructions given         Follow up in 1 year or sooner as needed.

## 2025-09-02 ENCOUNTER — PHARMACY VISIT (OUTPATIENT)
Dept: PHARMACY | Facility: CLINIC | Age: 75
End: 2025-09-02
Payer: COMMERCIAL

## 2025-09-02 ENCOUNTER — SPECIALTY PHARMACY (OUTPATIENT)
Dept: PHARMACY | Facility: CLINIC | Age: 75
End: 2025-09-02

## 2025-09-02 PROCEDURE — RXMED WILLOW AMBULATORY MEDICATION CHARGE

## 2025-09-03 ENCOUNTER — PHARMACY VISIT (OUTPATIENT)
Dept: PHARMACY | Facility: CLINIC | Age: 75
End: 2025-09-03
Payer: COMMERCIAL

## 2025-12-19 ENCOUNTER — APPOINTMENT (OUTPATIENT)
Dept: OPHTHALMOLOGY | Facility: CLINIC | Age: 75
End: 2025-12-19
Payer: COMMERCIAL

## 2026-07-29 ENCOUNTER — APPOINTMENT (OUTPATIENT)
Dept: DERMATOLOGY | Facility: CLINIC | Age: 76
End: 2026-07-29
Payer: COMMERCIAL